# Patient Record
Sex: FEMALE | Race: OTHER | NOT HISPANIC OR LATINO | Employment: FULL TIME | URBAN - METROPOLITAN AREA
[De-identification: names, ages, dates, MRNs, and addresses within clinical notes are randomized per-mention and may not be internally consistent; named-entity substitution may affect disease eponyms.]

---

## 2023-12-04 ENCOUNTER — CONSULT (OUTPATIENT)
Dept: ENDOCRINOLOGY | Facility: CLINIC | Age: 38
End: 2023-12-04
Payer: COMMERCIAL

## 2023-12-04 ENCOUNTER — APPOINTMENT (OUTPATIENT)
Dept: ENDOCRINOLOGY | Facility: CLINIC | Age: 38
End: 2023-12-04

## 2023-12-04 VITALS
WEIGHT: 207 LBS | DIASTOLIC BLOOD PRESSURE: 81 MMHG | BODY MASS INDEX: 39.08 KG/M2 | HEART RATE: 77 BPM | SYSTOLIC BLOOD PRESSURE: 122 MMHG | HEIGHT: 61 IN

## 2023-12-04 DIAGNOSIS — N97.9 SECONDARY FEMALE INFERTILITY: ICD-10-CM

## 2023-12-04 DIAGNOSIS — Z13.29 SCREENING FOR THYROID DISORDER: Primary | ICD-10-CM

## 2023-12-04 DIAGNOSIS — Z11.59 ENCOUNTER FOR SCREENING FOR OTHER VIRAL DISEASES: ICD-10-CM

## 2023-12-04 DIAGNOSIS — Z31.41 FERTILITY TESTING: ICD-10-CM

## 2023-12-04 DIAGNOSIS — Z11.3 SCREENING FOR STDS (SEXUALLY TRANSMITTED DISEASES): ICD-10-CM

## 2023-12-04 DIAGNOSIS — Z01.83 ENCOUNTER FOR RH BLOOD TYPING: ICD-10-CM

## 2023-12-04 PROCEDURE — 99204 OFFICE O/P NEW MOD 45 MIN: CPT | Performed by: NURSE PRACTITIONER

## 2023-12-04 PROCEDURE — 99214 OFFICE O/P EST MOD 30 MIN: CPT | Performed by: NURSE PRACTITIONER

## 2023-12-04 ASSESSMENT — LIFESTYLE VARIABLES
HISTORY_ALCOHOL_USE: NO
TOBACCO_USE: NO

## 2023-12-04 ASSESSMENT — PAIN SCALES - GENERAL: PAINLEVEL: 0-NO PAIN

## 2023-12-04 NOTE — PROGRESS NOTES
In Person    NEW FERTILITY PATIENT VISIT  SULEMA Tavarez  Referred by:   Accompanied today by: n/a, Self    Deena Art is a 38 y.o.  female who presents with Please tell us your reason for this visit today: Infertility    Stopped cOCP 2 years ago actively trying to conceive since stopping the cOCP.     PRIOR EVALUATION / TREATMENT  None     Prior Labs  Lab Results    Date Done      AMH: No results found for requested labs within last 1825 days. No results found for requested labs within last 1825 days.   TSH: 0.70 (Ref range: 0.44 - 3.98 mIU/L) 2022   PRL: No results found for requested labs within last 1825 days. No results found for requested labs within last 1825 days.   Testosterone: No results found for requested labs within last 1825 days. No results found for requested labs within last 1825 days.   DHEAS: No results found for requested labs within last 1825 days. No results found for requested labs within last 1825 days.   FSH: No results found for requested labs within last 1825 days. No results found for requested labs within last 1825 days.   17 OHP: No results found for requested labs within last 1825 days. No results found for requested labs within last 1825 days.   HgbA1c: 5.6 (Ref range: %) 2022   Hepatitis B surface antigen: No results found for requested labs within last 1825 days. No results found for requested labs within last 1825 days.   Hepatitis C antibody: No results found for requested labs within last 1825 days. No results found for requested labs within last 1825 days.   HIV ½ Antigen Antibody screen with reflex: No results found for requested labs within last 1825 days. No results found for requested labs within last 1825 days.   Syphilis screening with reflex: No results found for requested labs within last 1825 days. No results found for requested labs within last 1825 days.   GC: No results found for requested labs within last 1825 days. No results found for  requested labs within last 1825 days.   CT: No results found for requested labs within last 1825 days. No results found for requested labs within last 1825 days.   Type and Screen: A 2019   Rh: POS 2019   Antibody: No results found for requested labs within last 1825 days. No results found for requested labs within last 1825 days.   Rubella: No results found for requested labs within last 1825 days. No results found for requested labs within last 1825 days.   Varicella: No results found for requested labs within last 1825 days. No results found for requested labs within last 1825 days.   Hemoglobin: No results found for requested labs within last 1825 days. No results found for requested labs within last 1825 days.   Hematocrit: No results found for requested labs within last 1825 days. No results found for requested labs within last 1825 days.   Creatinine: 0.83 (Ref range: 0.50 - 1.05 mg/dL) 2022   AST:12 (Ref range: 9 - 39 U/L) 2022   ALT:10 (Ref range: 7 - 45 U/L): 2022        Relationship Status:   x 20 years      OB Hx all conceived spontaneously with current partner within 3-6 months of TTC   2005: FT, postdates 42 wga, IOL, LTCS for fetal distress, no complications in pregnancy, no complications with delivery, no complications postpartum,  x 6 weeks, went back on cOCP at 6 weeks PP   2007: FT IOL 39 wga , , no complications in pregnancy, no complications with delivery, no complications postpartum, did not breastfeed  went back on cOCP at 6 weeks PP   2010: FT IOL 39 wga , no complications in pregnancy, no complications with delivery, no complications postpartum, did not breastfeed went back on cOCP at 6 weeks PP   2014:  FT IOL 39 wga , no complications in pregnancy, no complications with delivery, no complications postpartum, did not breastfeed went back on cOCP at 6 weeks PP   2019:  FT IOL 39 wga , no complications in pregnancy, no complications with  delivery, no complications postpartum, did not breastfeed went back on cOCP at 6 weeks PP       OB History          5    Para   5    Term                AB        Living             SAB        IAB        Ectopic        Multiple        Live Births   5                 GYN HISTORY   Have you ever been diagnosed with a sexually transmitted disease? Have you ever been diagnosed with a sexually transmitted disease?: No    Please select all that are applicable:    Have you ever had Pelvic Inflammatory Disease? Have you ever had Pelvic Inflammatory Disease?: No    Have you had an abnormal PAP smear? Have you had an abnormal PAP smear?: No    Date & Result of last PAP smear: 2021 negative cytology negative HPV   Have you ever had an abnormal Mammogram? Have you ever had an abnormal mammogram?: No    Date & result of your last mammogram:  10 years ago, left breast cyst, told resume mammograms at Age 39  Do you have pelvic pain? Do you have pelvic pain?: No    How many times per week do you have intercourse? If applicable, how many times a week are you having intercourse, trying to get pregnant during your fertile window?: 2    Do you have pain with intercourse? Do you have pain with intercourse?: No    Do you use lubricants with intercourse? If applicable, what type of lubricant are you using?: Na    Do you have pain with bowel movements?    None          Do you have pain with a full bladder? Does get pain with full bladder, does have increased frequency  MENSTRUAL HISTORY  LMP: When was your last menstrual period?: I do not recall.  2023  Menarche:  13 years old   Contraception: cOCP, stopped taking 2 years ago  Cycle length: What is the average number of days between menstrual cycles?: 28  Menses used to be Q38 days very regular, now menses are Q28 days with ovulation CD 14  Describe your bleeding: Describe your bleeding:: Heavy  Bleeds for 7-9 days, not heavy but longer than  prepregnancy  Dysmenorrhea: Are your menstrual periods painful?: Yes  Severe menstrual cramps CD 3-5      ENDOCRINE/INFERTILITY HISTORY  Duration of infertility:  2 years   Coital Activity/week: If applicable, how many times a week are you having intercourse, trying to get pregnant during your fertile window?: 2    Nipple Discharge: Do you experience any loss of milk or liquid discharge from the breasts?: No    Vision changes: Are you experiencing any vision changes?: No    Headaches: Are you experiencing headaches?: Yes    Excess hair growth: Are you experiencing persistent or worsening hair growth on the face, breasts or lower abdomen?: No    Excessive hair loss: Are you experiencing loss of hair from your scalp?: Yes  Hair loss all over   Acne: Are you experiencing acne?: Yes    Oily skin: Oily skin?: Yes    Recent weight change: none   Weight gain: Are you experiencing any increase in weight?: Yes  Since last pregnancy has not been able to loose baby weight   Weight loss: Are you experiencing any decrease in weight?: No    Exercise more than 3 times a week: Exercise more than 3 times a week?: No      PMH  none  Past Medical History:   Diagnosis Date    Encounter for gynecological examination (general) (routine) without abnormal findings     Pap test, as part of routine gynecological examination    Encounter for routine postpartum follow-up 04/04/2022    Postpartum care following vaginal delivery    Encounter for supervision of normal pregnancy, unspecified, unspecified trimester 08/28/2018    Pregnancy at early stage    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     No current facility-administered  medications on file prior to visit.        PSH  Past Surgical History:   Procedure Laterality Date     SECTION, CLASSIC  2014     Section        PSYCH HISTORY  Have you ever been diagnosed with a mental health Issue?: No    Have you ever been hospitalized for a mental health disorder?: No       SOCIAL HISTORY  Social History     Tobacco Use    Smoking status: Never    Smokeless tobacco: Never   Vaping Use    Vaping Use: Never used   Substance Use Topics    Alcohol use: Never    Drug use: Never     Occupation:     Have you ever been incarcerated? Have you ever been incarcerated?: No    Do you have a history of domestic violence? Do you have a history of domestic violence?: No    Do you feel safe at home? Do you feel safe at home?: Yes    Do you have a history of any negative sexual experience such as incest or rape? 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:: Head of school    Prior fertility history: 5 children together   PMH: Partner Past Medical History:: Na    PSH: Partner Past Surgical History:: Na    Smoking:Partner: Recent or current tobacco use: Yes  Smokes cigarrettes   Alcohol Use: Partner: Recent or current alcohol use: No    Drug Use: Partner: Recent or current drug use: No    Medications:  none  Injuries: Partner: Any history of surgeries or injuries in the reproductive area?: No    STD: Have you ever been diagnosed with a sexually transmitted disease?: No    Please select all that are applicable:    SA: Prior Semen Analysis completed?: No    SA Results:  n/a    FAMILY HISTORY  No family history on file.    CANCER HISTORY    Breast: Breast Cancer: Please answer Yes, No or Unsure. If Yes, please, identify which family member.: Na    Ovarian: Ovarian Cancer: Please answer Yes, No or Unsure.  "If Yes, please, identify which family member.: Na    Colon: Colon Cancer: Please answer Yes, No or Unsure. If Yes, please, identify which family member.: Na    Endometrial: Endometrial Cancer: Please answer Yes, No or Unsure. If Yes, please, identify which family member.: Na      FAMILY VTE HISTORY  Family History of Blood Clots: Blood Clots: Please answer Yes, No or Unsure. If Yes, please, identify which family member.: Na      GENETIC HISTORY  Ethnic Background  Patient:  , [place]  Partner:  , [place]   Genetic Disease in Family  Patient: Patient: Defects and genetic syndromes? Please answer Yes, No or Unsure: No    Partner: Partner: Defects and genetic syndromes? Please answer Yes, No or Unsure: No    Birth Defects in Family  Patient: Patient: Birth defects? Please answer Yes, No or Unsure: No    Partner: Partner: Birth defects? Please answer Yes, No or Unsure: No    Genetic screening performed previously:  Carrier screening done through [email or web address], found to be \"compatible\" per pt      BMI:   BMI Readings from Last 1 Encounters:   23 39.11 kg/m²     VITALS:  /81 (BP Location: Right arm, Patient Position: Sitting, BP Cuff Size: Adult)   Pulse 77   Ht 1.549 m (5' 1\")   Wt 93.9 kg (207 lb)   LMP 2023   BMI 39.11 kg/m²     ASSESSMENT   38 y.o.  female with  secondary infertility x 2 years, suspected ovulation and the following pertinent medical issues: n/a .  Partner SA: No Assessment    COUNSELING  Pt is concerned that her bleeding length of her menses has become longer and cycles have become shorter making ovulation is too close to mikvah, (38 day cycles to 28 day cycles). Briefly discussed LP estrace to try to length follicular phase, but first recommend assessing ovarian reserve and assessing uterus for etiologies of bleeding    We discussed causes of infertility including hormonal, egg quality issues, structural problems such as endometriosis, adhesions, or tubal " problems, uterine factors such as polyps or fibroids, and sperm issues. Reviewed evaluation of such as well. We discussed various methods for achieving pregnancy in some detail including, ovulation induction, insemination, superovulation and IVF.    We discussed the impact of age on fertility. We discussed that a woman is born with all of the follicles that she will have in her lifetime and that these numbers progressively decrease as the patient reaches menopause. We discussed that women can remain fertile into their late 30’s and even early 40’s, however, chance for success is significantly lower for women who have infertility. We also discussed the higher rates of aneuploidy and miscarriage that occur as women age.    Routine Testing  Fertility Center  STDs Within 1 year   Genetic carrier Waiver/Completed   T&S Within 1 year   AMH Within 1 year   TSH Within 1 year   Rubella/Varicella Within 5 years     BMI Testing  Fertility Center  CBC Within 1 year   CMP Within 1 year   HgbA1c Within 1 year   Mag, Phos, Vit D <18 Within 1 year   MFM > 40  REQ   Wt loss consult > 40 OPT     PLAN  Orders Placed This Encounter   Procedures    US pelvis transvaginal    Antimullerian Hormone (Amh)    TSH with reflex to Free T4 if abnormal    Type And Screen    Rubella Antibody, Igg    Varicella Zoster Antibody, Igg    Hepatitis B surface antigen    Hepatitis C Antibody    Syphilis Screen with Reflex    HIV-1 and HIV-2 antibodies    C. Trachomatis / N. Gonorrhoeae, Amplified Detection       GENETIC SCREENING PATIENT  Waiver    PARTNER  Yes Semen Analysis: Pt declined partner screening at this time  Declined genetic carrier screening will need to sign waiver     FOLLOW UP   Pelvic ultrasound and labs  Chart to primary nurse for care coordination and patient check list/education  Enroll in Engaged MD  Take prenatal vitamins, vitamin D 2000 IUs daily  Discussed that PAP and mammogram must be updated if appropriate based on age and  clinical history and results received before treatment can begin  Discussed that treatment cannot proceed until checklist items are complete   6 week follow up with MD and [facility] (Dr. Bruce or myself)  If above testing wnl will likely plan for LP estrace to lengthen follicular phase +/- clomid for ovulation induction.      Joyce Morgan  12/04/2023  11:06 AM

## 2023-12-05 ENCOUNTER — ANCILLARY PROCEDURE (OUTPATIENT)
Dept: ENDOCRINOLOGY | Facility: CLINIC | Age: 38
End: 2023-12-05
Payer: COMMERCIAL

## 2023-12-05 DIAGNOSIS — Z31.41 FERTILITY TESTING: ICD-10-CM

## 2023-12-05 PROCEDURE — 76830 TRANSVAGINAL US NON-OB: CPT | Performed by: OBSTETRICS & GYNECOLOGY

## 2023-12-05 PROCEDURE — 76830 TRANSVAGINAL US NON-OB: CPT

## 2023-12-13 ENCOUNTER — LAB (OUTPATIENT)
Dept: LAB | Facility: LAB | Age: 38
End: 2023-12-13
Payer: COMMERCIAL

## 2023-12-13 DIAGNOSIS — Z13.29 SCREENING FOR THYROID DISORDER: ICD-10-CM

## 2023-12-13 DIAGNOSIS — Z11.3 SCREENING FOR STDS (SEXUALLY TRANSMITTED DISEASES): ICD-10-CM

## 2023-12-13 DIAGNOSIS — Z01.83 ENCOUNTER FOR RH BLOOD TYPING: ICD-10-CM

## 2023-12-13 DIAGNOSIS — Z31.41 FERTILITY TESTING: ICD-10-CM

## 2023-12-13 DIAGNOSIS — Z11.59 ENCOUNTER FOR SCREENING FOR OTHER VIRAL DISEASES: ICD-10-CM

## 2023-12-13 LAB
ABO GROUP (TYPE) IN BLOOD: NORMAL
ANTIBODY SCREEN: NORMAL
HBV SURFACE AG SERPL QL IA: NONREACTIVE
HCV AB SER QL: NONREACTIVE
HIV 1+2 AB+HIV1 P24 AG SERPL QL IA: NONREACTIVE
RH FACTOR (ANTIGEN D): NORMAL
RUBV IGG SERPL IA-ACNC: 1.2 IA
RUBV IGG SERPL QL IA: POSITIVE
T PALLIDUM AB SER QL: NONREACTIVE
TSH SERPL-ACNC: 0.97 MIU/L (ref 0.44–3.98)
VARICELLA ZOSTER IGG INDEX: 4 IA
VZV IGG SER QL IA: POSITIVE

## 2023-12-13 PROCEDURE — 87340 HEPATITIS B SURFACE AG IA: CPT

## 2023-12-13 PROCEDURE — 36415 COLL VENOUS BLD VENIPUNCTURE: CPT

## 2023-12-13 PROCEDURE — 86780 TREPONEMA PALLIDUM: CPT

## 2023-12-13 PROCEDURE — 86317 IMMUNOASSAY INFECTIOUS AGENT: CPT

## 2023-12-13 PROCEDURE — 84443 ASSAY THYROID STIM HORMONE: CPT

## 2023-12-13 PROCEDURE — 86850 RBC ANTIBODY SCREEN: CPT

## 2023-12-13 PROCEDURE — 86787 VARICELLA-ZOSTER ANTIBODY: CPT

## 2023-12-13 PROCEDURE — 87389 HIV-1 AG W/HIV-1&-2 AB AG IA: CPT

## 2023-12-13 PROCEDURE — 86803 HEPATITIS C AB TEST: CPT

## 2023-12-13 PROCEDURE — 86901 BLOOD TYPING SEROLOGIC RH(D): CPT

## 2023-12-13 PROCEDURE — 86900 BLOOD TYPING SEROLOGIC ABO: CPT

## 2023-12-13 PROCEDURE — 83516 IMMUNOASSAY NONANTIBODY: CPT

## 2023-12-13 PROCEDURE — 87800 DETECT AGNT MULT DNA DIREC: CPT

## 2023-12-14 LAB
C TRACH RRNA SPEC QL NAA+PROBE: NEGATIVE
N GONORRHOEA DNA SPEC QL PROBE+SIG AMP: NEGATIVE

## 2023-12-16 LAB — MIS SERPL-MCNC: 2.06 NG/ML (ref 0.18–11.71)

## 2023-12-26 ENCOUNTER — TELEPHONE (OUTPATIENT)
Dept: ENDOCRINOLOGY | Facility: CLINIC | Age: 38
End: 2023-12-26

## 2023-12-26 DIAGNOSIS — Z32.01 POSITIVE URINE PREGNANCY TEST (HHS-HCC): ICD-10-CM

## 2023-12-26 NOTE — PROGRESS NOTES
Returned patient phone call. Had seen CYNDY Morgan CNP on 12/04 for initial evaluation to establish care for fertility treatment but called today with a positive UPT. LMP=12/04. Natural TIC. Patient reports it was a very short menses (only about 1.5 days, not typical for usual cycles) and had a positive UPT over the weekend. Patient reported she had been taking a weightloss medication (Saxenda) as she had not anticipated she would conceive at this time. Last dose would have been 11/21, has some concern that there would be been overlapping depending on when conception actually occurred as this medication is contraindicated with pregnancy. Patient is no longer on medication since 11/21. Relayed to patient that we will start with having her go for an HCG level in the morning on 12/27, and then we will follow-up with her on next steps. Patient verbalized understanding of plan, all questions answered at this time. Will add patient to noon huddle for 12/27 for follow-up.   ALEAH SHANNON RN 12/26/2023 11:02

## 2023-12-27 NOTE — PROGRESS NOTES
Patient had HCG at outpatient  Lab today.  Positive UPT on 12/26/23 and also over past weekend.  LMP=12/04, Natural TIC cycle, Est GA = 3 2/7 weeks.    Patient reports it was a very short menses (only about 1.5 days, not typical for usual cycles).  Patient reported she had been taking a weightloss medication (Saxenda) as she had not anticipated she would conceive at this time. Last dose would have been 11/21, has some concern that there would be been overlapping depending on when conception actually occurred as this medication is contraindicated with pregnancy. Patient is no longer on medication since 11/21.   MARÍA ELENA Rosales RN 12/27/23 @ 0940    Patient still has not had HCG drawn by end of day. Will place on Noon Huddle for tomorrow to monitor for results and review with provider.  HAYLEE ROSALES on 12/27/23 at 4:30 PM.

## 2023-12-28 NOTE — PROGRESS NOTES
Patient going for HCG at outpatient  Lab today.  Positive UPT on 12/26/23 and also over past weekend.  LMP=12/04, Natural TIC cycle, Est GA = 3 3/7 weeks.    Patient did not go yesterday for HCG draw and has not gone yet this morning, will contact patient today to see when she is able to go for HCG level and have provider review for next steps.    ALEAH SHANNON RN 12/28/2023 11:49    Left voicemail to touch base with patient. Encouraged patient to call back, will add patient to noon huddle for tomorrow to follow-up in event lab work is completed tomorrow.   ALEAH SHANNON RN 12/28/2023 13:50

## 2023-12-29 ENCOUNTER — LAB (OUTPATIENT)
Dept: LAB | Facility: LAB | Age: 38
End: 2023-12-29
Payer: COMMERCIAL

## 2023-12-29 DIAGNOSIS — Z32.01 POSITIVE URINE PREGNANCY TEST (HHS-HCC): ICD-10-CM

## 2023-12-29 LAB — B-HCG SERPL-ACNC: ABNORMAL MIU/ML

## 2023-12-29 PROCEDURE — 36415 COLL VENOUS BLD VENIPUNCTURE: CPT

## 2023-12-29 PROCEDURE — 84702 CHORIONIC GONADOTROPIN TEST: CPT

## 2023-12-29 NOTE — PROGRESS NOTES
Patient going for HCG at outpatient  Lab today.  Positive UPT on 12/26/23 and also over past weekend.  LMP=12/04, Natural TIC cycle, Est GA = 3 4/7 weeks.    Patient was supposed to go for labs on 12/27, has not gone yet. Left voicemail yesterday to touch base/ see if she still plans to go to lab. Have not heard back from patient. Will plan to contact patient should she go for lab work to touch base on next steps.     ALEAH SHANNON RN 12/29/2023 11:10    Labs are in process and pending -- message sent to MANSOOR Keenan MD to follow -up with patient once labs have resulted.   ALEAH SHANNON RN 12/29/2023 15:15    Reviewed result of hCG level: 74,873, given elevated level at 3w5d there is possible concern for molar pregnancy. Recommend repeat hCG level and early OB scan on Tuesday, 1/2/24. Called patient to discuss plan but went direct to . Left detailed message with patient that we would schedule her for Tuesday and to call with any concerns. Sent request to  to schedule with patient.   Discussed with Dr. Mejia  Reviewed and approved by LORI KEENAN on 12/30/23 at 10:31 AM.

## 2023-12-30 DIAGNOSIS — O36.80X0 ENCOUNTER TO DETERMINE FETAL VIABILITY OF PREGNANCY, NOT APPLICABLE OR UNSPECIFIED FETUS (HHS-HCC): Primary | ICD-10-CM

## 2023-12-30 DIAGNOSIS — O02.81 INAPPROPRIATE LEVEL OF QUANTITATIVE HCG FOR GESTATIONAL AGE IN EARLY PREGNANCY (HHS-HCC): ICD-10-CM

## 2024-01-02 ENCOUNTER — ANCILLARY PROCEDURE (OUTPATIENT)
Dept: ENDOCRINOLOGY | Facility: CLINIC | Age: 39
End: 2024-01-02
Payer: COMMERCIAL

## 2024-01-02 ENCOUNTER — OFFICE VISIT (OUTPATIENT)
Dept: ENDOCRINOLOGY | Facility: CLINIC | Age: 39
End: 2024-01-02
Payer: COMMERCIAL

## 2024-01-02 ENCOUNTER — APPOINTMENT (OUTPATIENT)
Dept: ENDOCRINOLOGY | Facility: CLINIC | Age: 39
End: 2024-01-02
Payer: COMMERCIAL

## 2024-01-02 DIAGNOSIS — O36.80X0 ENCOUNTER TO DETERMINE FETAL VIABILITY OF PREGNANCY, SINGLE OR UNSPECIFIED FETUS (HHS-HCC): Primary | ICD-10-CM

## 2024-01-02 DIAGNOSIS — O36.80X0 ENCOUNTER TO DETERMINE FETAL VIABILITY OF PREGNANCY, NOT APPLICABLE OR UNSPECIFIED FETUS (HHS-HCC): ICD-10-CM

## 2024-01-02 DIAGNOSIS — O02.81 INAPPROPRIATE LEVEL OF QUANTITATIVE HCG FOR GESTATIONAL AGE IN EARLY PREGNANCY (HHS-HCC): ICD-10-CM

## 2024-01-02 LAB — B-HCG SERPL-ACNC: ABNORMAL MIU/ML

## 2024-01-02 PROCEDURE — 84702 CHORIONIC GONADOTROPIN TEST: CPT

## 2024-01-02 PROCEDURE — 99213 OFFICE O/P EST LOW 20 MIN: CPT | Performed by: NURSE PRACTITIONER

## 2024-01-02 PROCEDURE — 1036F TOBACCO NON-USER: CPT | Performed by: NURSE PRACTITIONER

## 2024-01-02 PROCEDURE — 36415 COLL VENOUS BLD VENIPUNCTURE: CPT

## 2024-01-02 PROCEDURE — 76817 TRANSVAGINAL US OBSTETRIC: CPT | Performed by: OBSTETRICS & GYNECOLOGY

## 2024-01-02 PROCEDURE — 76817 TRANSVAGINAL US OBSTETRIC: CPT

## 2024-01-03 NOTE — PROGRESS NOTES
In Person    OB Scan    Ectopic risk factor: no  PGTA/PGTM: no  Blood type: A+    Reviewed results from OB ultrasound today and results reviewed with pt as follows:     OB Scan results:   U/S 1/2/2024 based upon CRL 7 w + 5 d 8/15/2024  Assigned dating based on ultrasound (CRL), selected on 01/2/2024  7 w + 5 d 8/15/2024  Impression Early single intrauterine pregnancy with cardiac activity present. Size is not consistent with  dating provided. Recommend re-dating based on ultrasound.  Follow-up Plan: Release to OB Provider  Assessment Gestational sac: visualized. Location: intrauterine  Yolk sac: visualized  Embryo: visualized  Cardiac activity: present  Early placenta: circumferential  YS 3.6 mm 6w 3d 15% Papaioannou  CRL 13.1 mm 7w 5d 50% Pexsters   bpm    Detailed discussion with patient about her scan results, pregnancy, and next steps:    Plan:   Reviewed medications in detail. She will continue PNV.   Discussed with patient any pregnancy concerns and discussed establishing care with an  OB. Ok to transfer care to primary OBGYN at this time.   Advised to call with bleeding, pain or concerns.  Recommend pt call OB and schedule initial PNV prior to 12 wga. Pt will call Dr. Real's office today.    Ultrasound results and Plan of care reviewed with MD Joyce Joseph  01/02/2024  8:52 PM

## 2024-01-09 ENCOUNTER — APPOINTMENT (OUTPATIENT)
Dept: ENDOCRINOLOGY | Facility: CLINIC | Age: 39
End: 2024-01-09
Payer: COMMERCIAL

## 2024-01-17 ENCOUNTER — INITIAL PRENATAL (OUTPATIENT)
Dept: OBSTETRICS AND GYNECOLOGY | Facility: CLINIC | Age: 39
End: 2024-01-17
Payer: COMMERCIAL

## 2024-01-17 VITALS — DIASTOLIC BLOOD PRESSURE: 68 MMHG | BODY MASS INDEX: 39.11 KG/M2 | SYSTOLIC BLOOD PRESSURE: 115 MMHG | WEIGHT: 207 LBS

## 2024-01-17 DIAGNOSIS — Z3A.09 9 WEEKS GESTATION OF PREGNANCY (HHS-HCC): ICD-10-CM

## 2024-01-17 DIAGNOSIS — R82.71 ANTEPARTUM ASYMPTOMATIC BACTERIURIA IN FIRST TRIMESTER (HHS-HCC): ICD-10-CM

## 2024-01-17 DIAGNOSIS — O99.891 ANTEPARTUM ASYMPTOMATIC BACTERIURIA IN FIRST TRIMESTER (HHS-HCC): ICD-10-CM

## 2024-01-17 DIAGNOSIS — O09.521 ADVANCED MATERNAL AGE IN MULTIGRAVIDA, FIRST TRIMESTER (HHS-HCC): Primary | ICD-10-CM

## 2024-01-17 PROCEDURE — 87086 URINE CULTURE/COLONY COUNT: CPT

## 2024-01-17 PROCEDURE — 0500F INITIAL PRENATAL CARE VISIT: CPT | Performed by: OBSTETRICS & GYNECOLOGY

## 2024-01-17 ASSESSMENT — EDINBURGH POSTNATAL DEPRESSION SCALE (EPDS)
I HAVE BEEN ANXIOUS OR WORRIED FOR NO GOOD REASON: HARDLY EVER
I HAVE BLAMED MYSELF UNNECESSARILY WHEN THINGS WENT WRONG: NOT VERY OFTEN
I HAVE FELT SAD OR MISERABLE: NO, NOT AT ALL
TOTAL SCORE: 3
I HAVE LOOKED FORWARD WITH ENJOYMENT TO THINGS: AS MUCH AS I EVER DID
THINGS HAVE BEEN GETTING ON TOP OF ME: NO, I HAVE BEEN COPING AS WELL AS EVER
THE THOUGHT OF HARMING MYSELF HAS OCCURRED TO ME: NEVER
I HAVE BEEN SO UNHAPPY THAT I HAVE HAD DIFFICULTY SLEEPING: NOT AT ALL
I HAVE FELT SCARED OR PANICKY FOR NO GOOD REASON: NO, NOT MUCH
I HAVE BEEN SO UNHAPPY THAT I HAVE BEEN CRYING: NO, NEVER
I HAVE BEEN ABLE TO LAUGH AND SEE THE FUNNY SIDE OF THINGS: AS MUCH AS I ALWAYS COULD

## 2024-01-17 NOTE — PROGRESS NOTES
Subjective   Patient ID [id number]   Deena Art is a 38 y.o.  at 9w6d with a working estimated date of delivery of 8/15/2024, by Ultrasound who presents for an initial prenatal visit. This pregnancy is planned.    Her pregnancy is complicated by:  Advanced maternal age     Pt is overall feeling well. Having some nausea and fatigue.   Pt had 2 years of infertility and recently saw CRUZITO but then conceived naturally.  She is dated by a 7 week US which did not agree with her LMP.     Pt has had CD x 1 (first pregnancy).  She reports elevated BP at 42+ weeks but no diagnosis of pre-e.  CD was for NRFHT.  She subsequently has had 4 uncomplicated pregnancies with vaginal deliveries and would prefer IOL at 39 weeks.     OB History    Para Term  AB Living   6 5 5     5   SAB IAB Ectopic Multiple Live Births           5      # Outcome Date GA Lbr Julio/2nd Weight Sex Delivery Anes PTL Lv   6 Current            5 Term    3.317 kg F Vag-Spont      4 Term    3.374 kg M Vag-Spont   JOSE ANGEL   3 Term    2.863 kg F Vag-Spont   JOSE ANGEL   2 Term    3.402 kg F Vag-Spont   JOSE ANGEL   1 Term    3.317 kg M CS-LTranv   JOSE ANGEL     [place]  Depression Scale Total: 3    Objective   Physical Exam  Weight: 93.9 kg (207 lb)  Expected Total Weight Gain: 5 kg (11 lb)-9 kg (19 lb)   Pregravid BMI: 39.13  BP: 115/68          OBGyn Exam    Prenatal Labs  Pt had recent lab work which was wnl. Will not repeat labs just done as patient was actually pregnant at the time.  Pt interested in NIPS.     Assessment/Plan       Prenatal labs ordered.   NIPS ordered.   Pt will follow up in 2 weeks for doppler FHT check.

## 2024-01-19 LAB — BACTERIA UR CULT: ABNORMAL

## 2024-01-22 ENCOUNTER — LAB (OUTPATIENT)
Dept: LAB | Facility: LAB | Age: 39
End: 2024-01-22
Payer: COMMERCIAL

## 2024-01-22 DIAGNOSIS — O09.521 ADVANCED MATERNAL AGE IN MULTIGRAVIDA, FIRST TRIMESTER (HHS-HCC): ICD-10-CM

## 2024-01-22 DIAGNOSIS — Z3A.09 9 WEEKS GESTATION OF PREGNANCY (HHS-HCC): ICD-10-CM

## 2024-01-22 LAB
ABO GROUP (TYPE) IN BLOOD: NORMAL
ANTIBODY SCREEN: NORMAL
ERYTHROCYTE [DISTWIDTH] IN BLOOD BY AUTOMATED COUNT: 12.5 % (ref 11.5–14.5)
HCT VFR BLD AUTO: 38.3 % (ref 36–46)
HGB BLD-MCNC: 12.5 G/DL (ref 12–16)
MCH RBC QN AUTO: 28.5 PG (ref 26–34)
MCHC RBC AUTO-ENTMCNC: 32.6 G/DL (ref 32–36)
MCV RBC AUTO: 87 FL (ref 80–100)
NRBC BLD-RTO: 0 /100 WBCS (ref 0–0)
PLATELET # BLD AUTO: 359 X10*3/UL (ref 150–450)
RBC # BLD AUTO: 4.39 X10*6/UL (ref 4–5.2)
RH FACTOR (ANTIGEN D): NORMAL
WBC # BLD AUTO: 9.7 X10*3/UL (ref 4.4–11.3)

## 2024-01-22 PROCEDURE — 85027 COMPLETE CBC AUTOMATED: CPT

## 2024-01-22 PROCEDURE — 86850 RBC ANTIBODY SCREEN: CPT

## 2024-01-22 PROCEDURE — 86901 BLOOD TYPING SEROLOGIC RH(D): CPT

## 2024-01-22 PROCEDURE — 36415 COLL VENOUS BLD VENIPUNCTURE: CPT

## 2024-01-22 PROCEDURE — 86900 BLOOD TYPING SEROLOGIC ABO: CPT

## 2024-01-22 RX ORDER — CEPHALEXIN 500 MG/1
500 CAPSULE ORAL 4 TIMES DAILY
Qty: 20 CAPSULE | Refills: 0 | Status: SHIPPED | OUTPATIENT
Start: 2024-01-22 | End: 2024-01-27

## 2024-01-30 ENCOUNTER — ROUTINE PRENATAL (OUTPATIENT)
Dept: OBSTETRICS AND GYNECOLOGY | Facility: CLINIC | Age: 39
End: 2024-01-30
Payer: COMMERCIAL

## 2024-01-30 VITALS — DIASTOLIC BLOOD PRESSURE: 72 MMHG | WEIGHT: 210 LBS | BODY MASS INDEX: 39.68 KG/M2 | SYSTOLIC BLOOD PRESSURE: 118 MMHG

## 2024-01-30 DIAGNOSIS — O09.521 ADVANCED MATERNAL AGE IN MULTIGRAVIDA, FIRST TRIMESTER (HHS-HCC): ICD-10-CM

## 2024-01-30 DIAGNOSIS — Z3A.11 11 WEEKS GESTATION OF PREGNANCY (HHS-HCC): Primary | ICD-10-CM

## 2024-01-30 PROCEDURE — 0501F PRENATAL FLOW SHEET: CPT | Performed by: OBSTETRICS & GYNECOLOGY

## 2024-01-30 NOTE — PROGRESS NOTES
Pt currently 11w5d  Pt has no complaints except some right sided pain and bulge.  Normal fetal movement. Denies ctx, LOF, VB.     +FHT on bedside US  NIPS wnl. It's a girl!  Pt unsure about NT  Questionable right sided abdominal hernia. Mild intermittent discomfort. Will cont to monitor.

## 2024-03-06 ENCOUNTER — ROUTINE PRENATAL (OUTPATIENT)
Dept: OBSTETRICS AND GYNECOLOGY | Facility: CLINIC | Age: 39
End: 2024-03-06
Payer: COMMERCIAL

## 2024-03-06 VITALS — BODY MASS INDEX: 40.43 KG/M2 | WEIGHT: 214 LBS | DIASTOLIC BLOOD PRESSURE: 70 MMHG | SYSTOLIC BLOOD PRESSURE: 112 MMHG

## 2024-03-06 DIAGNOSIS — Z3A.16 16 WEEKS GESTATION OF PREGNANCY (HHS-HCC): Primary | ICD-10-CM

## 2024-03-06 PROCEDURE — 0501F PRENATAL FLOW SHEET: CPT | Performed by: OBSTETRICS & GYNECOLOGY

## 2024-03-06 NOTE — PROGRESS NOTES
Pt currently 16w6d  Pt has no complaints.    Normal fetal movement. Denies ctx, LOF, VB.     Dated by 7 week US.  Will schedule anatomy US for 19-20 weeks.     Pt has had CD x 1 (first pregnancy). She reports elevated BP at 42+ weeks but no diagnosis of pre-e. CD was for NRFHT. She subsequently has had 4 uncomplicated pregnancies with vaginal deliveries and would prefer IOL at 39 weeks.

## 2024-03-27 ENCOUNTER — HOSPITAL ENCOUNTER (OUTPATIENT)
Dept: RADIOLOGY | Facility: CLINIC | Age: 39
Discharge: HOME | End: 2024-03-27
Payer: COMMERCIAL

## 2024-03-27 DIAGNOSIS — Z36.89: ICD-10-CM

## 2024-03-27 DIAGNOSIS — Z3A.16 16 WEEKS GESTATION OF PREGNANCY (HHS-HCC): ICD-10-CM

## 2024-03-27 PROCEDURE — 76811 OB US DETAILED SNGL FETUS: CPT | Performed by: OBSTETRICS & GYNECOLOGY

## 2024-03-27 PROCEDURE — 76817 TRANSVAGINAL US OBSTETRIC: CPT | Performed by: OBSTETRICS & GYNECOLOGY

## 2024-03-27 PROCEDURE — 76811 OB US DETAILED SNGL FETUS: CPT

## 2024-03-27 PROCEDURE — 76817 TRANSVAGINAL US OBSTETRIC: CPT

## 2024-04-03 ENCOUNTER — ROUTINE PRENATAL (OUTPATIENT)
Dept: OBSTETRICS AND GYNECOLOGY | Facility: CLINIC | Age: 39
End: 2024-04-03
Payer: COMMERCIAL

## 2024-04-03 VITALS — SYSTOLIC BLOOD PRESSURE: 117 MMHG | DIASTOLIC BLOOD PRESSURE: 74 MMHG | WEIGHT: 213 LBS | BODY MASS INDEX: 40.25 KG/M2

## 2024-04-03 DIAGNOSIS — Z33.1 NORMAL PREGNANCY, INCIDENTAL (HHS-HCC): ICD-10-CM

## 2024-04-03 DIAGNOSIS — Z3A.20 20 WEEKS GESTATION OF PREGNANCY (HHS-HCC): Primary | ICD-10-CM

## 2024-04-03 PROCEDURE — 0501F PRENATAL FLOW SHEET: CPT | Performed by: OBSTETRICS & GYNECOLOGY

## 2024-04-03 NOTE — PROGRESS NOTES
Pt currently 20w6d  Pt has no complaints.    Normal fetal movement. Denies ctx, LOF, VB.       Anatomy US wnl. Growth US scheduled for 30 weeks.   H/o CD x 1 and successful  x 4.

## 2024-05-03 ENCOUNTER — APPOINTMENT (OUTPATIENT)
Dept: OBSTETRICS AND GYNECOLOGY | Facility: CLINIC | Age: 39
End: 2024-05-03
Payer: COMMERCIAL

## 2024-05-07 ENCOUNTER — TELEPHONE (OUTPATIENT)
Dept: OBSTETRICS AND GYNECOLOGY | Facility: CLINIC | Age: 39
End: 2024-05-07
Payer: COMMERCIAL

## 2024-05-07 NOTE — TELEPHONE ENCOUNTER
patient called to obtain an order for blood work she states was discussed and also for the glucose test. Patient canceled her May 3rd appointment and wanted to know if you felt it was necessary to come in before her may 31st appt?

## 2024-05-08 DIAGNOSIS — Z3A.25 25 WEEKS GESTATION OF PREGNANCY (HHS-HCC): Primary | ICD-10-CM

## 2024-05-08 NOTE — TELEPHONE ENCOUNTER
Called patient regarding orders for blood work and appointment follow up  Left vm for patient to return call.    WERNER Recinos RN, MD Brooke Francis RN  Caller: Unspecified (Yesterday, 10:49 AM)  Please let the patient know I Have ordered her mid pregnancy labs.  Please let the patient know her prenatal care is important and she should reschedule the visit she canceled in early May and she should absolutely keep her visit for the end of May.  Thanks.

## 2024-05-09 ENCOUNTER — TELEPHONE (OUTPATIENT)
Dept: OBSTETRICS AND GYNECOLOGY | Facility: CLINIC | Age: 39
End: 2024-05-09
Payer: COMMERCIAL

## 2024-05-09 NOTE — TELEPHONE ENCOUNTER
Called patient regarding mid pregnancy labs and importance of appointments  Left vm for patient to return call.    WERNER Recinos RN        MD Chelsie Russell RN  Caller: Unspecified (2 days ago, 10:49 AM)  Please let the patient know I Have ordered her mid pregnancy labs.  Please let the patient know her prenatal care is important and she should reschedule the visit she canceled in early May and she should absolutely keep her visit for the end of May.  Thanks.

## 2024-05-10 ENCOUNTER — TELEPHONE (OUTPATIENT)
Dept: OBSTETRICS AND GYNECOLOGY | Facility: CLINIC | Age: 39
End: 2024-05-10
Payer: COMMERCIAL

## 2024-05-10 NOTE — TELEPHONE ENCOUNTER
Called patient to inform her of lab orders placed and to encourage patient to reschedule missed appointment  Left vm for patient to return call.    EVELYN RecinosN RN

## 2024-05-17 ENCOUNTER — LAB (OUTPATIENT)
Dept: LAB | Facility: LAB | Age: 39
End: 2024-05-17
Payer: COMMERCIAL

## 2024-05-17 DIAGNOSIS — Z3A.25 25 WEEKS GESTATION OF PREGNANCY (HHS-HCC): ICD-10-CM

## 2024-05-17 LAB
ERYTHROCYTE [DISTWIDTH] IN BLOOD BY AUTOMATED COUNT: 13.3 % (ref 11.5–14.5)
GLUCOSE 1H P 50 G GLC PO SERPL-MCNC: 108 MG/DL
HCT VFR BLD AUTO: 33.3 % (ref 36–46)
HGB BLD-MCNC: 10.5 G/DL (ref 12–16)
MCH RBC QN AUTO: 28.2 PG (ref 26–34)
MCHC RBC AUTO-ENTMCNC: 31.5 G/DL (ref 32–36)
MCV RBC AUTO: 90 FL (ref 80–100)
NRBC BLD-RTO: 0 /100 WBCS (ref 0–0)
PLATELET # BLD AUTO: 369 X10*3/UL (ref 150–450)
RBC # BLD AUTO: 3.72 X10*6/UL (ref 4–5.2)
TREPONEMA PALLIDUM IGG+IGM AB [PRESENCE] IN SERUM OR PLASMA BY IMMUNOASSAY: NONREACTIVE
WBC # BLD AUTO: 11.5 X10*3/UL (ref 4.4–11.3)

## 2024-05-17 PROCEDURE — 86780 TREPONEMA PALLIDUM: CPT

## 2024-05-17 PROCEDURE — 85027 COMPLETE CBC AUTOMATED: CPT

## 2024-05-17 PROCEDURE — 36415 COLL VENOUS BLD VENIPUNCTURE: CPT

## 2024-05-17 PROCEDURE — 82947 ASSAY GLUCOSE BLOOD QUANT: CPT

## 2024-05-29 ENCOUNTER — HOSPITAL ENCOUNTER (OUTPATIENT)
Dept: RADIOLOGY | Facility: CLINIC | Age: 39
Discharge: HOME | End: 2024-05-29
Payer: COMMERCIAL

## 2024-05-29 DIAGNOSIS — Z3A.16 16 WEEKS GESTATION OF PREGNANCY (HHS-HCC): ICD-10-CM

## 2024-05-29 DIAGNOSIS — O99.210: ICD-10-CM

## 2024-05-29 PROCEDURE — 76817 TRANSVAGINAL US OBSTETRIC: CPT

## 2024-05-29 PROCEDURE — 76816 OB US FOLLOW-UP PER FETUS: CPT

## 2024-05-29 PROCEDURE — 76817 TRANSVAGINAL US OBSTETRIC: CPT | Performed by: STUDENT IN AN ORGANIZED HEALTH CARE EDUCATION/TRAINING PROGRAM

## 2024-05-29 PROCEDURE — 76819 FETAL BIOPHYS PROFIL W/O NST: CPT | Performed by: STUDENT IN AN ORGANIZED HEALTH CARE EDUCATION/TRAINING PROGRAM

## 2024-05-29 PROCEDURE — 76816 OB US FOLLOW-UP PER FETUS: CPT | Performed by: STUDENT IN AN ORGANIZED HEALTH CARE EDUCATION/TRAINING PROGRAM

## 2024-05-29 PROCEDURE — 76819 FETAL BIOPHYS PROFIL W/O NST: CPT

## 2024-05-31 ENCOUNTER — ROUTINE PRENATAL (OUTPATIENT)
Dept: OBSTETRICS AND GYNECOLOGY | Facility: CLINIC | Age: 39
End: 2024-05-31
Payer: COMMERCIAL

## 2024-05-31 VITALS — DIASTOLIC BLOOD PRESSURE: 76 MMHG | SYSTOLIC BLOOD PRESSURE: 120 MMHG | WEIGHT: 222 LBS | BODY MASS INDEX: 41.95 KG/M2

## 2024-05-31 DIAGNOSIS — Z3A.29 29 WEEKS GESTATION OF PREGNANCY (HHS-HCC): Primary | ICD-10-CM

## 2024-05-31 PROCEDURE — 0501F PRENATAL FLOW SHEET: CPT | Performed by: OBSTETRICS & GYNECOLOGY

## 2024-05-31 NOTE — PROGRESS NOTES
Pt currently 29w1d  Pt has no complaints except pressure.    Normal fetal movement. Denies ctx, LOF, VB.     Questionable low lying placenta --> follow up ultrasound normal, placenta NOT low lying  -US @ 28.6 weeks , placenta right lateral, not low lying  -growth wnl  -repeat growth at 35 weeks d/t obesity    Recent 1 hr glucose wnl

## 2024-06-07 ENCOUNTER — APPOINTMENT (OUTPATIENT)
Dept: RADIOLOGY | Facility: CLINIC | Age: 39
End: 2024-06-07
Payer: COMMERCIAL

## 2024-06-14 ENCOUNTER — APPOINTMENT (OUTPATIENT)
Dept: OBSTETRICS AND GYNECOLOGY | Facility: CLINIC | Age: 39
End: 2024-06-14
Payer: COMMERCIAL

## 2024-06-14 VITALS — WEIGHT: 224 LBS | BODY MASS INDEX: 42.32 KG/M2 | DIASTOLIC BLOOD PRESSURE: 72 MMHG | SYSTOLIC BLOOD PRESSURE: 106 MMHG

## 2024-06-14 DIAGNOSIS — Z3A.31 31 WEEKS GESTATION OF PREGNANCY (HHS-HCC): Primary | ICD-10-CM

## 2024-06-14 NOTE — PROGRESS NOTES
Pt currently 31w1d  Pt has no complaints.    Normal fetal movement. Denies ctx, LOF, VB.     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  We have discussed how this is close to her due date and not an ideal time to be out of state but if she chooses to go she should research nearby hospital with labor and delivery services in case emergency care is needed.     Plan for TDAP next visit.

## 2024-06-28 ENCOUNTER — APPOINTMENT (OUTPATIENT)
Dept: OBSTETRICS AND GYNECOLOGY | Facility: CLINIC | Age: 39
End: 2024-06-28
Payer: COMMERCIAL

## 2024-06-28 VITALS — WEIGHT: 228 LBS | DIASTOLIC BLOOD PRESSURE: 70 MMHG | SYSTOLIC BLOOD PRESSURE: 114 MMHG | BODY MASS INDEX: 43.08 KG/M2

## 2024-06-28 DIAGNOSIS — Z3A.33 33 WEEKS GESTATION OF PREGNANCY (HHS-HCC): Primary | ICD-10-CM

## 2024-06-28 PROCEDURE — 0501F PRENATAL FLOW SHEET: CPT | Performed by: OBSTETRICS & GYNECOLOGY

## 2024-06-28 PROCEDURE — 90471 IMMUNIZATION ADMIN: CPT | Performed by: OBSTETRICS & GYNECOLOGY

## 2024-06-28 PROCEDURE — 90715 TDAP VACCINE 7 YRS/> IM: CPT | Performed by: OBSTETRICS & GYNECOLOGY

## 2024-06-28 NOTE — PROGRESS NOTES
Pt currently 33w1d  Pt feeling fatigued and a little bit off today. Denies HA, blurry vision. Mainly just tired. Has been doing a lot of preparations for camp and did go out to a wedding last evening.   Normal fetal movement. Denies ctx, LOF, VB.     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 We have discussed how this is close to her due date and not an ideal time to be out of state but if she chooses to go she should research nearby hospital with labor and delivery services in case emergency care is needed.  She plans to be in touch via [employer] with BP once weekly and if any concerns.     TDAP offered and accepted today.     36 week growth US scheduled

## 2024-07-01 ENCOUNTER — TELEPHONE (OUTPATIENT)
Dept: OBSTETRICS AND GYNECOLOGY | Facility: CLINIC | Age: 39
End: 2024-07-01
Payer: COMMERCIAL

## 2024-07-01 NOTE — TELEPHONE ENCOUNTER
Called patient to discuss symptoms she is experiencing  Left vm for patient to return call.    WERNER Recinos RN      ----- Message from Deena Art sent at 6/30/2024  1:04 AM EDT -----  Regarding: Swollen feet  Contact: [phone]  Hi Dr. Real,  Over this weekend I noticed significant swelling in my feet. I never had this during pregnancy only after delivery from IV. Unless it’s maybe a side effect from the dtap vaccine I had Friday. Either way I thought it’s important to bring to your attention…

## 2024-07-01 NOTE — TELEPHONE ENCOUNTER
Patient returning call  Identified by name and   Foot swelling its not typical for her  Specifically by ankles   Took BP on Friday, 114/70 ray   No dizziness, lightheadedness, SOB, h/a, blurred or changing vision, no RUQ pain, no abnormal facial swelling  Swelling is equal on both sides  Encouraged to monitor for now and reach back out if swelling continues to worsen or she notices any other abnormal symptoms  Encouraged fluid intake, resting and elevating extremities, submerging feet in cool water for ~10 minutes at a time, explained likely pregnancy sx and d/t high temperatures over the past 2 weeks  Patient verbalized understanding, all questions/concerns addressed at this time.    Chelsie Murphy, EVELYNN RN

## 2024-07-01 NOTE — TELEPHONE ENCOUNTER
Pt states that she received a phone call and has been trying to return the call,but cannot seem to reach anyone. Pt is requesting that someone please return her call.

## 2024-07-12 ENCOUNTER — APPOINTMENT (OUTPATIENT)
Dept: OBSTETRICS AND GYNECOLOGY | Facility: CLINIC | Age: 39
End: 2024-07-12
Payer: COMMERCIAL

## 2024-07-26 ENCOUNTER — APPOINTMENT (OUTPATIENT)
Dept: OBSTETRICS AND GYNECOLOGY | Facility: CLINIC | Age: 39
End: 2024-07-26
Payer: COMMERCIAL

## 2024-07-26 ENCOUNTER — HOSPITAL ENCOUNTER (OUTPATIENT)
Dept: RADIOLOGY | Facility: CLINIC | Age: 39
Discharge: HOME | End: 2024-07-26
Payer: COMMERCIAL

## 2024-07-26 DIAGNOSIS — Z3A.16 16 WEEKS GESTATION OF PREGNANCY (HHS-HCC): ICD-10-CM

## 2024-07-26 PROCEDURE — 76816 OB US FOLLOW-UP PER FETUS: CPT

## 2024-07-26 PROCEDURE — 76819 FETAL BIOPHYS PROFIL W/O NST: CPT

## 2024-07-29 ENCOUNTER — TELEPHONE (OUTPATIENT)
Dept: OBSTETRICS AND GYNECOLOGY | Facility: CLINIC | Age: 39
End: 2024-07-29

## 2024-07-29 ENCOUNTER — APPOINTMENT (OUTPATIENT)
Dept: OBSTETRICS AND GYNECOLOGY | Facility: CLINIC | Age: 39
End: 2024-07-29
Payer: COMMERCIAL

## 2024-07-30 ENCOUNTER — TELEPHONE (OUTPATIENT)
Dept: OBSTETRICS AND GYNECOLOGY | Facility: CLINIC | Age: 39
End: 2024-07-30
Payer: COMMERCIAL

## 2024-07-30 NOTE — TELEPHONE ENCOUNTER
Pt states that she is about 37wks pregnant and is experiencing discomfort and pain. Pt states that her foot hurt and she cannot sleep. Pt would like to be seen right away.

## 2024-07-30 NOTE — TELEPHONE ENCOUNTER
Called patient to discuss pain she is experiencing  Identified by name and   feels like leg is becoming numb completely left leg, at night it bad too,  From knee to upper thigh  Having contractions, irregular, painful, worse at night, but sending shocks down her leg   She is wondering what all she can do for this  Explained that this does happen and sounds like it is nerve pain, may need support belt as baby is maybe causing discomfort  Encouraged to take tylenol, patient would like order for maternity belt sent in  Patient wondering if she can be seen tomorrow, doesn't know if she can wait til Thursday  Informed her no appointments available and Thursday is soonest  Discussed going to L&D if pain is this severe and that RN will forward to provider to discuss    Chelsie Murphy, EVELYNN RN

## 2024-08-01 ENCOUNTER — APPOINTMENT (OUTPATIENT)
Dept: OBSTETRICS AND GYNECOLOGY | Facility: CLINIC | Age: 39
End: 2024-08-01
Payer: COMMERCIAL

## 2024-08-05 ENCOUNTER — HOSPITAL ENCOUNTER (INPATIENT)
Facility: HOSPITAL | Age: 39
LOS: 3 days | Discharge: HOME | End: 2024-08-08
Attending: OBSTETRICS & GYNECOLOGY | Admitting: OBSTETRICS & GYNECOLOGY
Payer: COMMERCIAL

## 2024-08-05 PROBLEM — Z3A.38 38 WEEKS GESTATION OF PREGNANCY (HHS-HCC): Status: ACTIVE | Noted: 2024-08-05

## 2024-08-05 PROCEDURE — 3E033VJ INTRODUCTION OF OTHER HORMONE INTO PERIPHERAL VEIN, PERCUTANEOUS APPROACH: ICD-10-PCS | Performed by: OBSTETRICS & GYNECOLOGY

## 2024-08-05 PROCEDURE — 1120000001 HC OB PRIVATE ROOM DAILY

## 2024-08-05 PROCEDURE — 99221 1ST HOSP IP/OBS SF/LOW 40: CPT | Performed by: STUDENT IN AN ORGANIZED HEALTH CARE EDUCATION/TRAINING PROGRAM

## 2024-08-05 PROCEDURE — 99214 OFFICE O/P EST MOD 30 MIN: CPT

## 2024-08-05 RX ORDER — SODIUM CHLORIDE, SODIUM LACTATE, POTASSIUM CHLORIDE, CALCIUM CHLORIDE 600; 310; 30; 20 MG/100ML; MG/100ML; MG/100ML; MG/100ML
125 INJECTION, SOLUTION INTRAVENOUS CONTINUOUS
Status: DISCONTINUED | OUTPATIENT
Start: 2024-08-05 | End: 2024-08-06

## 2024-08-05 RX ORDER — METOCLOPRAMIDE HYDROCHLORIDE 5 MG/ML
10 INJECTION INTRAMUSCULAR; INTRAVENOUS EVERY 6 HOURS PRN
Status: DISCONTINUED | OUTPATIENT
Start: 2024-08-05 | End: 2024-08-06

## 2024-08-05 RX ORDER — LOPERAMIDE HYDROCHLORIDE 2 MG/1
4 CAPSULE ORAL EVERY 2 HOUR PRN
Status: DISCONTINUED | OUTPATIENT
Start: 2024-08-05 | End: 2024-08-06

## 2024-08-05 RX ORDER — LIDOCAINE HYDROCHLORIDE 10 MG/ML
30 INJECTION INFILTRATION; PERINEURAL ONCE AS NEEDED
Status: COMPLETED | OUTPATIENT
Start: 2024-08-05 | End: 2024-08-06

## 2024-08-05 RX ORDER — METOCLOPRAMIDE 10 MG/1
10 TABLET ORAL EVERY 6 HOURS PRN
Status: DISCONTINUED | OUTPATIENT
Start: 2024-08-05 | End: 2024-08-06

## 2024-08-05 RX ORDER — MISOPROSTOL 200 UG/1
800 TABLET ORAL ONCE AS NEEDED
Status: DISCONTINUED | OUTPATIENT
Start: 2024-08-05 | End: 2024-08-06

## 2024-08-05 RX ORDER — OXYTOCIN 10 [USP'U]/ML
10 INJECTION, SOLUTION INTRAMUSCULAR; INTRAVENOUS ONCE AS NEEDED
Status: DISCONTINUED | OUTPATIENT
Start: 2024-08-05 | End: 2024-08-06

## 2024-08-05 RX ORDER — ONDANSETRON 4 MG/1
4 TABLET, FILM COATED ORAL EVERY 6 HOURS PRN
Status: DISCONTINUED | OUTPATIENT
Start: 2024-08-05 | End: 2024-08-06

## 2024-08-05 RX ORDER — PENICILLIN G 3000000 [IU]/50ML
3 INJECTION, SOLUTION INTRAVENOUS EVERY 4 HOURS
Status: DISCONTINUED | OUTPATIENT
Start: 2024-08-06 | End: 2024-08-06

## 2024-08-05 RX ORDER — ACETAMINOPHEN 325 MG/1
650 TABLET ORAL EVERY 6 HOURS PRN
Status: DISCONTINUED | OUTPATIENT
Start: 2024-08-05 | End: 2024-08-06

## 2024-08-05 RX ORDER — HYDRALAZINE HYDROCHLORIDE 20 MG/ML
5 INJECTION INTRAMUSCULAR; INTRAVENOUS ONCE AS NEEDED
Status: DISCONTINUED | OUTPATIENT
Start: 2024-08-05 | End: 2024-08-06

## 2024-08-05 RX ORDER — LABETALOL HYDROCHLORIDE 5 MG/ML
20 INJECTION, SOLUTION INTRAVENOUS ONCE AS NEEDED
Status: DISCONTINUED | OUTPATIENT
Start: 2024-08-05 | End: 2024-08-06

## 2024-08-05 RX ORDER — CARBOPROST TROMETHAMINE 250 UG/ML
250 INJECTION, SOLUTION INTRAMUSCULAR ONCE AS NEEDED
Status: DISCONTINUED | OUTPATIENT
Start: 2024-08-05 | End: 2024-08-06

## 2024-08-05 RX ORDER — METHYLERGONOVINE MALEATE 0.2 MG/ML
0.2 INJECTION INTRAVENOUS ONCE AS NEEDED
Status: DISCONTINUED | OUTPATIENT
Start: 2024-08-05 | End: 2024-08-06

## 2024-08-05 RX ORDER — ONDANSETRON HYDROCHLORIDE 2 MG/ML
4 INJECTION, SOLUTION INTRAVENOUS EVERY 6 HOURS PRN
Status: DISCONTINUED | OUTPATIENT
Start: 2024-08-05 | End: 2024-08-06

## 2024-08-05 RX ORDER — OXYTOCIN/0.9 % SODIUM CHLORIDE 30/500 ML
60 PLASTIC BAG, INJECTION (ML) INTRAVENOUS ONCE AS NEEDED
Status: COMPLETED | OUTPATIENT
Start: 2024-08-05 | End: 2024-08-06

## 2024-08-05 RX ORDER — OXYTOCIN/0.9 % SODIUM CHLORIDE 30/500 ML
2-30 PLASTIC BAG, INJECTION (ML) INTRAVENOUS CONTINUOUS
Status: DISCONTINUED | OUTPATIENT
Start: 2024-08-05 | End: 2024-08-06

## 2024-08-05 RX ORDER — NIFEDIPINE 10 MG/1
10 CAPSULE ORAL ONCE AS NEEDED
Status: DISCONTINUED | OUTPATIENT
Start: 2024-08-05 | End: 2024-08-06

## 2024-08-05 RX ORDER — NALBUPHINE HYDROCHLORIDE 10 MG/ML
10 INJECTION, SOLUTION INTRAMUSCULAR; INTRAVENOUS; SUBCUTANEOUS
Status: DISCONTINUED | OUTPATIENT
Start: 2024-08-05 | End: 2024-08-06

## 2024-08-05 RX ORDER — TERBUTALINE SULFATE 1 MG/ML
0.25 INJECTION SUBCUTANEOUS ONCE AS NEEDED
Status: DISCONTINUED | OUTPATIENT
Start: 2024-08-05 | End: 2024-08-06

## 2024-08-05 RX ORDER — FAMOTIDINE 20 MG/1
20 TABLET, FILM COATED ORAL 2 TIMES DAILY
COMMUNITY

## 2024-08-05 RX ORDER — TRANEXAMIC ACID 100 MG/ML
1000 INJECTION, SOLUTION INTRAVENOUS ONCE AS NEEDED
Status: DISCONTINUED | OUTPATIENT
Start: 2024-08-05 | End: 2024-08-06

## 2024-08-05 SDOH — SOCIAL STABILITY: SOCIAL INSECURITY: DOES ANYONE TRY TO KEEP YOU FROM HAVING/CONTACTING OTHER FRIENDS OR DOING THINGS OUTSIDE YOUR HOME?: NO

## 2024-08-05 SDOH — SOCIAL STABILITY: SOCIAL INSECURITY: HAS ANYONE EVER THREATENED TO HURT YOUR FAMILY OR YOUR PETS?: NO

## 2024-08-05 SDOH — SOCIAL STABILITY: SOCIAL INSECURITY: ABUSE SCREEN: ADULT

## 2024-08-05 SDOH — SOCIAL STABILITY: SOCIAL INSECURITY: VERBAL ABUSE: DENIES

## 2024-08-05 SDOH — SOCIAL STABILITY: SOCIAL INSECURITY: ARE THERE ANY APPARENT SIGNS OF INJURIES/BEHAVIORS THAT COULD BE RELATED TO ABUSE/NEGLECT?: NO

## 2024-08-05 SDOH — HEALTH STABILITY: MENTAL HEALTH: SUICIDAL BEHAVIOR (LIFETIME): NO

## 2024-08-05 SDOH — HEALTH STABILITY: MENTAL HEALTH: WERE YOU ABLE TO COMPLETE ALL THE BEHAVIORAL HEALTH SCREENINGS?: YES

## 2024-08-05 SDOH — HEALTH STABILITY: MENTAL HEALTH: HAVE YOU USED ANY SUBSTANCES (CANABIS, COCAINE, HEROIN, HALLUCINOGENS, INHALANTS, ETC.) IN THE PAST 12 MONTHS?: NO

## 2024-08-05 SDOH — HEALTH STABILITY: MENTAL HEALTH: WISH TO BE DEAD (PAST 1 MONTH): NO

## 2024-08-05 SDOH — HEALTH STABILITY: MENTAL HEALTH: NON-SPECIFIC ACTIVE SUICIDAL THOUGHTS (PAST 1 MONTH): NO

## 2024-08-05 SDOH — HEALTH STABILITY: MENTAL HEALTH: HAVE YOU USED ANY PRESCRIPTION DRUGS OTHER THAN PRESCRIBED IN THE PAST 12 MONTHS?: NO

## 2024-08-05 SDOH — SOCIAL STABILITY: SOCIAL INSECURITY: DO YOU FEEL ANYONE HAS EXPLOITED OR TAKEN ADVANTAGE OF YOU FINANCIALLY OR OF YOUR PERSONAL PROPERTY?: NO

## 2024-08-05 SDOH — SOCIAL STABILITY: SOCIAL INSECURITY: PHYSICAL ABUSE: DENIES

## 2024-08-05 SDOH — SOCIAL STABILITY: SOCIAL INSECURITY: HAVE YOU HAD ANY THOUGHTS OF HARMING ANYONE ELSE?: NO

## 2024-08-05 SDOH — ECONOMIC STABILITY: HOUSING INSECURITY: DO YOU FEEL UNSAFE GOING BACK TO THE PLACE WHERE YOU ARE LIVING?: NO

## 2024-08-05 SDOH — SOCIAL STABILITY: SOCIAL INSECURITY: ARE YOU OR HAVE YOU BEEN THREATENED OR ABUSED PHYSICALLY, EMOTIONALLY, OR SEXUALLY BY ANYONE?: NO

## 2024-08-05 SDOH — SOCIAL STABILITY: SOCIAL INSECURITY: HAVE YOU HAD THOUGHTS OF HARMING ANYONE ELSE?: NO

## 2024-08-05 ASSESSMENT — PAIN SCALES - GENERAL: PAINLEVEL_OUTOF10: 0 - NO PAIN

## 2024-08-05 ASSESSMENT — ACTIVITIES OF DAILY LIVING (ADL): LACK_OF_TRANSPORTATION: NO

## 2024-08-05 ASSESSMENT — LIFESTYLE VARIABLES
AUDIT-C TOTAL SCORE: 0
AUDIT-C TOTAL SCORE: 0
SKIP TO QUESTIONS 9-10: 1
HOW MANY STANDARD DRINKS CONTAINING ALCOHOL DO YOU HAVE ON A TYPICAL DAY: PATIENT DOES NOT DRINK
HOW OFTEN DO YOU HAVE 6 OR MORE DRINKS ON ONE OCCASION: NEVER
HOW OFTEN DO YOU HAVE A DRINK CONTAINING ALCOHOL: NEVER

## 2024-08-05 ASSESSMENT — PATIENT HEALTH QUESTIONNAIRE - PHQ9
2. FEELING DOWN, DEPRESSED OR HOPELESS: NOT AT ALL
1. LITTLE INTEREST OR PLEASURE IN DOING THINGS: NOT AT ALL
SUM OF ALL RESPONSES TO PHQ9 QUESTIONS 1 & 2: 0

## 2024-08-06 ENCOUNTER — ANESTHESIA EVENT (OUTPATIENT)
Dept: OBSTETRICS AND GYNECOLOGY | Facility: HOSPITAL | Age: 39
End: 2024-08-06
Payer: COMMERCIAL

## 2024-08-06 ENCOUNTER — ANESTHESIA (OUTPATIENT)
Dept: OBSTETRICS AND GYNECOLOGY | Facility: HOSPITAL | Age: 39
End: 2024-08-06
Payer: COMMERCIAL

## 2024-08-06 PROBLEM — E66.9 OBESITY: Status: ACTIVE | Noted: 2024-08-06

## 2024-08-06 LAB
ABO GROUP (TYPE) IN BLOOD: NORMAL
ANTIBODY SCREEN: NORMAL
ERYTHROCYTE [DISTWIDTH] IN BLOOD BY AUTOMATED COUNT: 13.6 % (ref 11.5–14.5)
HCT VFR BLD AUTO: 30.4 % (ref 36–46)
HGB BLD-MCNC: 9.7 G/DL (ref 12–16)
MCH RBC QN AUTO: 25.5 PG (ref 26–34)
MCHC RBC AUTO-ENTMCNC: 31.9 G/DL (ref 32–36)
MCV RBC AUTO: 80 FL (ref 80–100)
NRBC BLD-RTO: 0 /100 WBCS (ref 0–0)
PLATELET # BLD AUTO: 389 X10*3/UL (ref 150–450)
RBC # BLD AUTO: 3.81 X10*6/UL (ref 4–5.2)
RH FACTOR (ANTIGEN D): NORMAL
TREPONEMA PALLIDUM IGG+IGM AB [PRESENCE] IN SERUM OR PLASMA BY IMMUNOASSAY: NONREACTIVE
WBC # BLD AUTO: 10.3 X10*3/UL (ref 4.4–11.3)

## 2024-08-06 PROCEDURE — 2500000004 HC RX 250 GENERAL PHARMACY W/ HCPCS (ALT 636 FOR OP/ED): Performed by: NURSE ANESTHETIST, CERTIFIED REGISTERED

## 2024-08-06 PROCEDURE — 86780 TREPONEMA PALLIDUM: CPT | Performed by: STUDENT IN AN ORGANIZED HEALTH CARE EDUCATION/TRAINING PROGRAM

## 2024-08-06 PROCEDURE — 2500000001 HC RX 250 WO HCPCS SELF ADMINISTERED DRUGS (ALT 637 FOR MEDICARE OP)

## 2024-08-06 PROCEDURE — 2500000005 HC RX 250 GENERAL PHARMACY W/O HCPCS: Performed by: NURSE ANESTHETIST, CERTIFIED REGISTERED

## 2024-08-06 PROCEDURE — 51701 INSERT BLADDER CATHETER: CPT

## 2024-08-06 PROCEDURE — 0UQMXZZ REPAIR VULVA, EXTERNAL APPROACH: ICD-10-PCS | Performed by: SPECIALIST

## 2024-08-06 PROCEDURE — 2500000005 HC RX 250 GENERAL PHARMACY W/O HCPCS: Performed by: STUDENT IN AN ORGANIZED HEALTH CARE EDUCATION/TRAINING PROGRAM

## 2024-08-06 PROCEDURE — 3700000014 EPIDURAL BLOCK: Performed by: NURSE ANESTHETIST, CERTIFIED REGISTERED

## 2024-08-06 PROCEDURE — 59050 FETAL MONITOR W/REPORT: CPT

## 2024-08-06 PROCEDURE — 59409 OBSTETRICAL CARE: CPT | Performed by: SPECIALIST

## 2024-08-06 PROCEDURE — 86923 COMPATIBILITY TEST ELECTRIC: CPT

## 2024-08-06 PROCEDURE — 7210000002 HC LABOR PER HOUR

## 2024-08-06 PROCEDURE — 86901 BLOOD TYPING SEROLOGIC RH(D): CPT | Performed by: STUDENT IN AN ORGANIZED HEALTH CARE EDUCATION/TRAINING PROGRAM

## 2024-08-06 PROCEDURE — 2500000001 HC RX 250 WO HCPCS SELF ADMINISTERED DRUGS (ALT 637 FOR MEDICARE OP): Performed by: STUDENT IN AN ORGANIZED HEALTH CARE EDUCATION/TRAINING PROGRAM

## 2024-08-06 PROCEDURE — 7100000016 HC LABOR RECOVERY PER HOUR

## 2024-08-06 PROCEDURE — 85027 COMPLETE CBC AUTOMATED: CPT | Performed by: STUDENT IN AN ORGANIZED HEALTH CARE EDUCATION/TRAINING PROGRAM

## 2024-08-06 PROCEDURE — 01967 NEURAXL LBR ANES VAG DLVR: CPT | Performed by: ANESTHESIOLOGY

## 2024-08-06 PROCEDURE — 36415 COLL VENOUS BLD VENIPUNCTURE: CPT | Performed by: STUDENT IN AN ORGANIZED HEALTH CARE EDUCATION/TRAINING PROGRAM

## 2024-08-06 PROCEDURE — 10907ZC DRAINAGE OF AMNIOTIC FLUID, THERAPEUTIC FROM PRODUCTS OF CONCEPTION, VIA NATURAL OR ARTIFICIAL OPENING: ICD-10-PCS | Performed by: OBSTETRICS & GYNECOLOGY

## 2024-08-06 PROCEDURE — 1100000001 HC PRIVATE ROOM DAILY

## 2024-08-06 PROCEDURE — 2500000004 HC RX 250 GENERAL PHARMACY W/ HCPCS (ALT 636 FOR OP/ED): Performed by: STUDENT IN AN ORGANIZED HEALTH CARE EDUCATION/TRAINING PROGRAM

## 2024-08-06 RX ORDER — METHYLERGONOVINE MALEATE 0.2 MG/ML
0.2 INJECTION INTRAVENOUS ONCE AS NEEDED
Status: DISCONTINUED | OUTPATIENT
Start: 2024-08-06 | End: 2024-08-08 | Stop reason: HOSPADM

## 2024-08-06 RX ORDER — ADHESIVE BANDAGE
10 BANDAGE TOPICAL
Status: DISCONTINUED | OUTPATIENT
Start: 2024-08-06 | End: 2024-08-08 | Stop reason: HOSPADM

## 2024-08-06 RX ORDER — POLYETHYLENE GLYCOL 3350 17 G/17G
17 POWDER, FOR SOLUTION ORAL 2 TIMES DAILY PRN
Status: DISCONTINUED | OUTPATIENT
Start: 2024-08-06 | End: 2024-08-08 | Stop reason: HOSPADM

## 2024-08-06 RX ORDER — LOPERAMIDE HYDROCHLORIDE 2 MG/1
4 CAPSULE ORAL EVERY 2 HOUR PRN
Status: DISCONTINUED | OUTPATIENT
Start: 2024-08-06 | End: 2024-08-08 | Stop reason: HOSPADM

## 2024-08-06 RX ORDER — OXYTOCIN/0.9 % SODIUM CHLORIDE 30/500 ML
60 PLASTIC BAG, INJECTION (ML) INTRAVENOUS ONCE AS NEEDED
Status: DISCONTINUED | OUTPATIENT
Start: 2024-08-06 | End: 2024-08-08 | Stop reason: HOSPADM

## 2024-08-06 RX ORDER — MISOPROSTOL 200 UG/1
800 TABLET ORAL ONCE AS NEEDED
Status: DISCONTINUED | OUTPATIENT
Start: 2024-08-06 | End: 2024-08-08 | Stop reason: HOSPADM

## 2024-08-06 RX ORDER — FENTANYL/BUPIVACAINE/NS/PF 2MCG/ML-.1
PLASTIC BAG, INJECTION (ML) INJECTION AS NEEDED
Status: DISCONTINUED | OUTPATIENT
Start: 2024-08-06 | End: 2024-08-06

## 2024-08-06 RX ORDER — FENTANYL CITRATE 50 UG/ML
INJECTION, SOLUTION INTRAMUSCULAR; INTRAVENOUS AS NEEDED
Status: DISCONTINUED | OUTPATIENT
Start: 2024-08-06 | End: 2024-08-06

## 2024-08-06 RX ORDER — LIDOCAINE 560 MG/1
1 PATCH PERCUTANEOUS; TOPICAL; TRANSDERMAL
Status: DISCONTINUED | OUTPATIENT
Start: 2024-08-06 | End: 2024-08-08 | Stop reason: HOSPADM

## 2024-08-06 RX ORDER — FENTANYL/BUPIVACAINE/NS/PF 2MCG/ML-.1
PLASTIC BAG, INJECTION (ML) INJECTION CONTINUOUS PRN
Status: DISCONTINUED | OUTPATIENT
Start: 2024-08-06 | End: 2024-08-06

## 2024-08-06 RX ORDER — ACETAMINOPHEN 325 MG/1
975 TABLET ORAL EVERY 6 HOURS
Status: DISCONTINUED | OUTPATIENT
Start: 2024-08-06 | End: 2024-08-08 | Stop reason: HOSPADM

## 2024-08-06 RX ORDER — IBUPROFEN 600 MG/1
600 TABLET ORAL EVERY 6 HOURS
Status: DISCONTINUED | OUTPATIENT
Start: 2024-08-06 | End: 2024-08-08 | Stop reason: HOSPADM

## 2024-08-06 RX ORDER — ENOXAPARIN SODIUM 100 MG/ML
60 INJECTION SUBCUTANEOUS EVERY 24 HOURS
Status: DISCONTINUED | OUTPATIENT
Start: 2024-08-07 | End: 2024-08-08 | Stop reason: HOSPADM

## 2024-08-06 RX ORDER — NIFEDIPINE 10 MG/1
10 CAPSULE ORAL ONCE AS NEEDED
Status: DISCONTINUED | OUTPATIENT
Start: 2024-08-06 | End: 2024-08-08 | Stop reason: HOSPADM

## 2024-08-06 RX ORDER — TRANEXAMIC ACID 100 MG/ML
1000 INJECTION, SOLUTION INTRAVENOUS ONCE AS NEEDED
Status: DISCONTINUED | OUTPATIENT
Start: 2024-08-06 | End: 2024-08-08 | Stop reason: HOSPADM

## 2024-08-06 RX ORDER — ONDANSETRON 4 MG/1
4 TABLET, FILM COATED ORAL EVERY 6 HOURS PRN
Status: DISCONTINUED | OUTPATIENT
Start: 2024-08-06 | End: 2024-08-08 | Stop reason: HOSPADM

## 2024-08-06 RX ORDER — DIPHENHYDRAMINE HCL 25 MG
25 CAPSULE ORAL EVERY 6 HOURS PRN
Status: DISCONTINUED | OUTPATIENT
Start: 2024-08-06 | End: 2024-08-08 | Stop reason: HOSPADM

## 2024-08-06 RX ORDER — FAMOTIDINE 20 MG/1
20 TABLET, FILM COATED ORAL 2 TIMES DAILY PRN
Status: DISCONTINUED | OUTPATIENT
Start: 2024-08-06 | End: 2024-08-06

## 2024-08-06 RX ORDER — OXYTOCIN 10 [USP'U]/ML
10 INJECTION, SOLUTION INTRAMUSCULAR; INTRAVENOUS ONCE AS NEEDED
Status: DISCONTINUED | OUTPATIENT
Start: 2024-08-06 | End: 2024-08-08 | Stop reason: HOSPADM

## 2024-08-06 RX ORDER — HYDRALAZINE HYDROCHLORIDE 20 MG/ML
5 INJECTION INTRAMUSCULAR; INTRAVENOUS ONCE AS NEEDED
Status: DISCONTINUED | OUTPATIENT
Start: 2024-08-06 | End: 2024-08-08 | Stop reason: HOSPADM

## 2024-08-06 RX ORDER — BISACODYL 10 MG/1
10 SUPPOSITORY RECTAL DAILY PRN
Status: DISCONTINUED | OUTPATIENT
Start: 2024-08-06 | End: 2024-08-08 | Stop reason: HOSPADM

## 2024-08-06 RX ORDER — LABETALOL HYDROCHLORIDE 5 MG/ML
20 INJECTION, SOLUTION INTRAVENOUS ONCE AS NEEDED
Status: DISCONTINUED | OUTPATIENT
Start: 2024-08-06 | End: 2024-08-08 | Stop reason: HOSPADM

## 2024-08-06 RX ORDER — CARBOPROST TROMETHAMINE 250 UG/ML
250 INJECTION, SOLUTION INTRAMUSCULAR ONCE AS NEEDED
Status: DISCONTINUED | OUTPATIENT
Start: 2024-08-06 | End: 2024-08-08 | Stop reason: HOSPADM

## 2024-08-06 RX ORDER — FAMOTIDINE 20 MG/1
20 TABLET, FILM COATED ORAL 2 TIMES DAILY
Status: DISCONTINUED | OUTPATIENT
Start: 2024-08-06 | End: 2024-08-08 | Stop reason: HOSPADM

## 2024-08-06 RX ORDER — ONDANSETRON HYDROCHLORIDE 2 MG/ML
4 INJECTION, SOLUTION INTRAVENOUS EVERY 6 HOURS PRN
Status: DISCONTINUED | OUTPATIENT
Start: 2024-08-06 | End: 2024-08-08 | Stop reason: HOSPADM

## 2024-08-06 RX ORDER — DIPHENHYDRAMINE HYDROCHLORIDE 50 MG/ML
25 INJECTION INTRAMUSCULAR; INTRAVENOUS EVERY 6 HOURS PRN
Status: DISCONTINUED | OUTPATIENT
Start: 2024-08-06 | End: 2024-08-08 | Stop reason: HOSPADM

## 2024-08-06 RX ORDER — SIMETHICONE 80 MG
80 TABLET,CHEWABLE ORAL 4 TIMES DAILY PRN
Status: DISCONTINUED | OUTPATIENT
Start: 2024-08-06 | End: 2024-08-08 | Stop reason: HOSPADM

## 2024-08-06 SDOH — HEALTH STABILITY: MENTAL HEALTH: CURRENT SMOKER: 0

## 2024-08-06 ASSESSMENT — PAIN SCALES - GENERAL
PAINLEVEL_OUTOF10: 1
PAINLEVEL_OUTOF10: 0 - NO PAIN
PAINLEVEL_OUTOF10: 2
PAINLEVEL_OUTOF10: 0 - NO PAIN
PAINLEVEL_OUTOF10: 0 - NO PAIN
PAINLEVEL_OUTOF10: 2
PAINLEVEL_OUTOF10: 0 - NO PAIN

## 2024-08-06 ASSESSMENT — PAIN DESCRIPTION - LOCATION: LOCATION: HEAD

## 2024-08-06 ASSESSMENT — PAIN DESCRIPTION - DESCRIPTORS: DESCRIPTORS: CRAMPING

## 2024-08-06 NOTE — PROGRESS NOTES
Intrapartum Progress Note    Assessment/Plan   Deena Art is a 38 y.o.  at 38w5d. ARABELLA: 8/15/2024, by Ultrasound here for IOL iso decreased fetal movement.     IOL  Labor Course  2230 3/30/-3  0012 started pit   0610 /-3 AROM for clear   1015 /-3  Continue pitocin and monitor for cervical change     Decreased fetal movement  Resolved in triage, FHT reassuring     Pregnancy notables:   GBS Bacteriuria, on penicillin  Prior c/f low lying placenta --> resolved on 28wk US  Hx CS in first pregnancy, VBACx4 afterwards, MFMU 80.9%    Seen and discussed with Dr. Vinita Infante MD, PGY-1    Subjective   Patient reports feeling well with epidural and pitocin infusing. Feeling more pressure and would like SVE now.    Objective   Last Vitals:  Temp Pulse Resp BP MAP Pulse Ox   36.2 °C (97.2 °F) 68 18 119/74 91 98 %     Vitals Min/Max Last 24 Hours:  Temp  Min: 35.9 °C (96.6 °F)  Max: 36.3 °C (97.3 °F)  Pulse  Min: 58  Max: 96  Resp  Min: 16  Max: 18  BP  Min: 109/71  Max: 142/85  MAP (mmHg)  Min: 83  Max: 111    Intake/Output:    Intake/Output Summary (Last 24 hours) at 2024 0945  Last data filed at 2024 0754  Gross per 24 hour   Intake --   Output 650 ml   Net -650 ml       Physical Examination:  Constitutional: No acute distress, alert and cooperative  Head/Neck: Normocephalic  Respiratory: Normal respiratory effort on RA, no increased WOB  Cardiovascular: warm and well perfused  Gastrointestinal: gravid  Musculoskeletal: grossly normal ROM  Extremities: No LE edema  Neurological: alert, oriented, no gross deficit  Psychological: Appropriate mood and behavior     FHR is 120 baseline, with mod variability, + accels, - decels , and a category I tracing.    [place] reading: q2-4 mins    Lab Review:  Labs in chart were reviewed.

## 2024-08-06 NOTE — ANESTHESIA PREPROCEDURE EVALUATION
Patient: Deena Art    Evaluation Method: In-person visit    Procedure Information    Date: 24  Procedure: Labor Analgesia         Relevant Problems   Anesthesia  Epidural without issue; GA for c/s without issue      Cardiac (within normal limits)      Pulmonary (within normal limits)      Neuro (within normal limits)      GI (within normal limits)      /Renal (within normal limits)      Liver (within normal limits)      Endocrine   (+) Obesity      Hematology (within normal limits)      Musculoskeletal (within normal limits)      GYN   (+) 38 weeks gestation of pregnancy ([facility])       Clinical information reviewed:   Tobacco  Allergies  Meds   Med Hx  Surg Hx   Fam Hx          NPO Detail:  No data recorded     OB/Gyn Evaluation    Present Pregnancy    Pregnant now:  @38.5 IOL for DFM; VBACx4.  (+) , previous  section   Obstetric History    (+)  section - primary, vaginal birth after               Physical Exam    Airway  Mallampati: III  TM distance: >3 FB     Cardiovascular    Dental        Pulmonary    Abdominal          Vitals:    24 0432   BP:    Pulse:    Resp:    Temp: 36 °C (96.8 °F)   SpO2:      128/70  Hr 70s  Anesthesia Plan    History of general anesthesia?: yes  History of complications of general anesthesia?: no    ASA 3     epidural     The patient is not a current smoker.    Anesthetic plan and risks discussed with patient.  Use of blood products discussed with patient who consented to blood products.    Plan discussed with attending.

## 2024-08-06 NOTE — CARE PLAN
The patient's goals for the shift include safe delivery      The clinical goals for the shift include reassuring FHR during shift      Problem: Vaginal Birth or  Section  Goal: Fetal and maternal status remain reassuring during the birth process  Outcome: Progressing  Goal: Prevention of malpresentation/labor dystocia through delivery  Outcome: Progressing  Goal: Demonstrates labor coping techniques through delivery  Outcome: Progressing  Goal: Minimal s/sx of HDP and BP<160/110  Outcome: Progressing     Problem: Pain - Adult  Goal: Verbalizes/displays adequate comfort level or baseline comfort level  Outcome: Progressing     Problem: Safety - Adult  Goal: Free from fall injury  Outcome: Progressing

## 2024-08-06 NOTE — L&D DELIVERY NOTE
OB Delivery Note  2024  Deena Art  38 y.o.   Vaginal, Spontaneous       Gestational Age: 38w5d  /Para:   Quantitative Blood Loss: Admission to Discharge: 150 mL (2024  8:31 PM - 2024  4:41 PM)    Kevyn Art [[id number]]      Labor Events    Sac identifier: Sac 1  Rupture date/time: 2024 0607  Rupture type: Artificial  Fluid color: Clear  Fluid odor: None  Labor type: Induced Onset of Labor  Labor allowed to proceed with plans for an attempted vaginal birth?: Yes  Induction: Oxytocin  Induction date/time: 2024 0012  Complications: None       Labor Event Times    Labor onset date/time: 2024  Dilation complete date/time: 2024 141  Start pushing date/time: 2024       Labor Length    1st stage: 17h 45m  2nd stage: 0h 09m  3rd stage: 0h 05m       Placenta    Placenta delivery date/time: 2024 1430  Placenta removal: Expressed  Placenta appearance: Intact  Placenta disposition: discarded       Cord    Vessels: 3 vessels  Complications: None  Delayed cord clamping?: Yes  Cord clamped date/time: 2024 14:17:00  Cord blood disposition: Discarded  Gases sent?: No  Stem cell collection (by provider): No       Lacerations    Episiotomy: None  Perineal laceration: None  Periurethral laceration?: Yes  Periurethral laceration location: right  Periurethral laceration repaired?: Yes  Repair suture: 4-0 Synthetic Suture       Anesthesia    Method: Epidural       Operative Delivery    Forceps attempted?: No  Vacuum extractor attempted?: No       Shoulder Dystocia    Shoulder dystocia present?: No        Delivery    Time head delivered: 2024 14:25:00  Birth date/time: 2024 14:25:00  Delivery type: Vaginal, Spontaneous  Complications: None       Resuscitation    Method: Tactile stimulation, Suctioning       Apgars    Living status: Living  Apgar Component Scores:  1 min.:  5 min.:  10 min.:  15 min.:  20 min.:    Skin color:  0  1       Heart rate:  2   2       Reflex irritability:  2  2       Muscle tone:  2  2       Respiratory effort:  2  2       Total:  8  9       Apgars assigned by: NATALIE WILSON       Delivery Providers    Delivering clinician: Nicky Wiseman MD   Provider Role    Alicia Norwood RN Delivery Nurse    Natalie Wilson RN Nursery Nurse    Char Infante MD Resident                 Char Infante MD

## 2024-08-06 NOTE — SIGNIFICANT EVENT
LABOR PROGRESS NOTE  SUBJECTIVE  Patient doing well with epidural and pitocin infusing. Feeling more pressure and desires re-check.    OBJECTIVE  Visit Vitals  BP (!) 145/78   Pulse 69   Temp 36.5 °C (97.7 °F) (Temporal)   Resp 18        Cervical Exam  Dilation: 6  Effacement (%): 70  Fetal Station: -3  Method: Manual  OB Examiner: golden infante MD  Fetal Assessment  Movement: Present  Mode: External US  Baseline Fetal Heart Rate (bpm): 125 bpm  Baseline Classification: Normal  Variability: Moderate (Between 6 and 25 BPM)  Pattern: Accelerations  Pattern Observations: pt up to bathroom at 0435. spotty tracing after d/t pt sitting for epidural  FHR Category: Category I  Multiple Births: No      Contraction Frequency: 2-5    A&P    IOL  - Continue to monitor for cervical change  - Continue pitocin  - Labor Course  2230 3/30/-3  0012 started pit   0610 AROM for clear, 4/70/-3  1015 5/70/-3  1150 6/70/-3    Char Infante MD, PGY-1

## 2024-08-06 NOTE — ANESTHESIA PROCEDURE NOTES
Epidural Block    Patient location during procedure: OB  Start time: 8/6/2024 4:54 AM  End time: 8/6/2024 5:22 AM  Reason for block: labor analgesia  Staffing  Performed: CRNA   Authorized by: JAMES Garcia    Performed by: JAMES Garcia    Preanesthetic Checklist  Completed: patient identified, IV checked, site marked, risks and benefits discussed, surgical consent, monitors and equipment checked, pre-op evaluation, timeout performed and sterile techniques followed  Block Timeout  RN/Licensed healthcare professional reads aloud to the Anesthesia provider and entire team: Patient identity, procedure with side and site, patient position, and as applicable the availability of implants/special equipment/special requirements.  Patient on coagulant treatment: no  Timeout performed at: 8/6/2024 4:55 AM  Block Placement  Patient position: sitting  Prep: ChloraPrep  Sterility prep: cap, drape, gloves and mask  Sedation level: no sedation  Patient monitoring: blood pressure, continuous pulse oximetry and heart rate  Approach: midline  Local numbing: lidocaine 1% to skin and subcutaneous tissues  Vertebral space: lumbar  Lumbar location: L3-L4  Epidural  Loss of resistance technique: saline  Guidance: landmark technique        Needle  Needle type: Tuohy   Needle gauge: 17  Needle length: 8.9cm  Needle insertion depth: 6 cm  Catheter type: multi-orifice  Catheter size: 19 G  Catheter at skin depth: 11 cm  Catheter securement method: liquid medical adhesive and clear occlusive dressing    Test dose: lidocaine 1.5% with epinephrine 1-to-200,000  Test dose: lidocaine 1.5% with epinephrine 1-to-200,000  Test dose result: no positive test dose    PCEA  Medication concentration used: 0.044% Bupivacaine with 1.25 mcg/mL Fentanyl and [id number] Epinephrine  Dose (mL): 10  Lockout (minutes): 15  1-Hour Limit (boluses/hr): 4  Basal Rate: 14        Assessment  Block outcome: pain improved  Number of attempts:  2  Events: no positive test dose  Procedure assessment: patient tolerated procedure well with no immediate complications  Additional Notes  First placement uneventful. Before test dose, the catheter off sterile field. Removed catheter, tip intact. Procedure restarted, and second catheter placed without incident.

## 2024-08-06 NOTE — SIGNIFICANT EVENT
"Labor Progress Note    Subjective: Pt resting comfortably in bed, epidural infusing. Her contractions are much improved with the epidural and she is amenable to AROM.     Objective:  Vitals: /74   Pulse 74   Temp 36 °C (96.8 °F)   Resp 16   Ht 1.549 m (5' 1\")   Wt 107 kg (235 lb 7.2 oz)   LMP 12/04/2023   SpO2 98%   BMI 44.49 kg/m²   SVE: 4/70/-3  FHT: 135/mod/+accels/-decels  [place]: q2-4m  AROM for clear, tinged with blood    A/P:    IOL  Continue pitocin per protocol, continue PCN  CEFM, currently Category I  Epidural infusing    Kelly Hung, M4  Patient seen and evaluated with medical student. Agree with assessment above, edits made within text.  Edd Becker, PGY4 OBGYN  Seen and discussed with Dr. Alfonso      "

## 2024-08-06 NOTE — DISCHARGE INSTRUCTIONS

## 2024-08-06 NOTE — H&P
Obstetrical Admission History and Physical    Assessment/Plan    Deena Art is a 38 y.o.  at 38w4d, ARABELLA: 8/15/2024, by LMP c/w 7wk US admitted for IOL, iso decreased fetal movement.    IOL  Admitted, consented, scanned, cephalic  Will induce with pitocin per protocol, for AROM when appropriate  Epidural as requested  GBS positive (bacteriuria ), on PCN  EFW 3079g (52%ile), AC 74%ile () --> extrapolated 3329g  CEFM, currently Cat I  Delivery plan: patient desires vaginal delivery, patient counseled on risks of labor, vaginal delivery, and possibility of C/S for varying maternal or fetal indications    Decreased fetal movement  Resolved in triage, FHT reassuring    Pregnancy notables:   GBS Bacteriuria  Prior c/f low lying placenta --> resolved on 28wk US  Hx CS in first pregnancy, VBACx4 since, MFMU 80.9%    Postpartum planning:  Contraception: undecided    Kelly Hung, M4  Patient seen and evaluated with medical student. Agree with assessment above, edits made within text.  Edd Becker, PGY4 OBGYN  Seen and discussed with Dr. Liliam Otto   Deena Art is a 37yo  at 38w5d by 7wk US, presenting with decreased fetal movement and lower extremity swelling. Since this morning, she has not been feeling her baseline amount of movement from the baby. She reports it has improved in triage. Pt reports copious vaginal discharge, but no large leakage of fluid. She endorses irregular contractions. Denies vaginal bleeding.   Denies f/c, cough, n/v, SOB, HA, vision changes.    Pregnancy notable for:  Medical Problems       Problem List       * (Principal) 38 weeks gestation of pregnancy ([facility])          Obstetrical History   OB History    Para Term  AB Living   6 5 5     5   SAB IAB Ectopic Multiple Live Births           5      # Outcome Date GA Lbr Julio/2nd Weight Sex Type Anes PTL Lv   6 Current            5 Term 2019   3.317 kg F Vag-Spont      4 Term    3.374 kg  M Vag-Spont   JOSE ANGEL   3 Term    2.863 kg F Vag-Spont   JOSE ANGEL   2 Term    3.402 kg F Vag-Spont   JOSE ANGEL   1 Term    3.317 kg M CS-LTranv   JOSE ANGEL       Past Medical History  Past Medical History:   Diagnosis Date    Encounter for gynecological examination (general) (routine) without abnormal findings     Pap test, as part of routine gynecological examination    Encounter for routine postpartum follow-up ([facility]) 2022    Postpartum care following vaginal delivery    Encounter for supervision of normal pregnancy, unspecified, unspecified trimester ([facility]) 2018    Pregnancy at early stage    Mammary duct ectasia of left breast 2015    Duct ectasia of breast, left    Personal history of diseases of the skin and subcutaneous tissue 10/28/2014    History of cyst of breast    Personal history of other specified conditions 2015    History of breast lump        Past Surgical History   Past Surgical History:   Procedure Laterality Date     SECTION, CLASSIC  2014     Section       Social History  Social History     Tobacco Use    Smoking status: Never    Smokeless tobacco: Never   Substance Use Topics    Alcohol use: Never     Substance and Sexual Activity   Drug Use Never       Allergies  Patient has no known allergies.     Medications  Medications Prior to Admission   Medication Sig Dispense Refill Last Dose    prenatal vitamin, iron-folic, 27 mg iron-800 mcg folic acid tablet Take 1 tablet by mouth once daily. 90 tablet 3        Objective    Last Vitals  Temp Pulse Resp BP MAP O2 Sat   36.3 °C (97.3 °F) 91 18 124/60   98 %     Physical Examination  General: no acute distress  HEENT: normocephalic, atraumatic  Heart: warm and well perfused  Lungs: breathing comfortably on room air  Abdomen: gravid  Extremities: moving all extremities  Neuro: awake and conversant  Psych: appropriate mood and affect  SVE: 3/30/-3  FHT: 130/mod/+accel/-decel  [place]: q2-4min  BSUS: cephalic    Lab  Review  Labs in chart have been reviewed.

## 2024-08-07 PROBLEM — O16.3 ELEVATED BLOOD PRESSURE COMPLICATING PREGNANCY IN THIRD TRIMESTER, ANTEPARTUM (HHS-HCC): Status: ACTIVE | Noted: 2024-08-07

## 2024-08-07 PROCEDURE — 1100000001 HC PRIVATE ROOM DAILY

## 2024-08-07 PROCEDURE — 2500000001 HC RX 250 WO HCPCS SELF ADMINISTERED DRUGS (ALT 637 FOR MEDICARE OP)

## 2024-08-07 PROCEDURE — 2500000001 HC RX 250 WO HCPCS SELF ADMINISTERED DRUGS (ALT 637 FOR MEDICARE OP): Performed by: NURSE PRACTITIONER

## 2024-08-07 RX ORDER — SENNOSIDES 8.6 MG/1
1 TABLET ORAL NIGHTLY
Status: DISCONTINUED | OUTPATIENT
Start: 2024-08-07 | End: 2024-08-07

## 2024-08-07 RX ORDER — ALUMINUM HYDROXIDE, MAGNESIUM HYDROXIDE, AND SIMETHICONE 1200; 120; 1200 MG/30ML; MG/30ML; MG/30ML
10 SUSPENSION ORAL 4 TIMES DAILY PRN
Status: DISCONTINUED | OUTPATIENT
Start: 2024-08-07 | End: 2024-08-08 | Stop reason: HOSPADM

## 2024-08-07 RX ORDER — CYCLOBENZAPRINE HCL 10 MG
10 TABLET ORAL 3 TIMES DAILY PRN
Status: DISCONTINUED | OUTPATIENT
Start: 2024-08-07 | End: 2024-08-08 | Stop reason: HOSPADM

## 2024-08-07 RX ORDER — SENNOSIDES 8.6 MG/1
1 TABLET ORAL DAILY
Status: DISCONTINUED | OUTPATIENT
Start: 2024-08-07 | End: 2024-08-08 | Stop reason: HOSPADM

## 2024-08-07 ASSESSMENT — PAIN SCALES - PAIN ASSESSMENT IN ADVANCED DEMENTIA (PAINAD): TOTALSCORE: MEDICATION (SEE MAR)

## 2024-08-07 ASSESSMENT — PAIN DESCRIPTION - LOCATION: LOCATION: ABDOMEN

## 2024-08-07 ASSESSMENT — PAIN SCALES - GENERAL
PAINLEVEL_OUTOF10: 3
PAINLEVEL_OUTOF10: 5 - MODERATE PAIN
PAINLEVEL_OUTOF10: 3
PAINLEVEL_OUTOF10: 5 - MODERATE PAIN
PAINLEVEL_OUTOF10: 2

## 2024-08-07 ASSESSMENT — PAIN DESCRIPTION - DESCRIPTORS
DESCRIPTORS: SORE
DESCRIPTORS: DISCOMFORT;SORE
DESCRIPTORS: DISCOMFORT;SORE

## 2024-08-07 ASSESSMENT — PAIN DESCRIPTION - ORIENTATION: ORIENTATION: LOWER

## 2024-08-07 NOTE — CARE PLAN
The patient's goals for the shift include   Problem: Safety - Adult  Goal: Free from fall injury  Outcome: Progressing     Problem: Postpartum  Goal: Experiences normal postpartum course  Outcome: Progressing  Goal: Appropriate maternal -  bonding  Outcome: Progressing           VSS, bleeding and swelling minimal, pain controlled with medications, IV removed, formula feeding.

## 2024-08-07 NOTE — ANESTHESIA POSTPROCEDURE EVALUATION
Patient: Deena Art is a 38 y.o., , who had a Vaginal, Spontaneous delivery on 2024 at 38w5d and is now POD1.    She had Neuraxial Anesthesia without immediate complications noted.       Pain well controlled    Vitals:    24 0420   BP: 117/67   Pulse: 59   Resp: 16   Temp: 36.2 °C (97.2 °F)   SpO2: 97%       Neuraxial site assessed. No visible redness or swelling or drainage. Approximately 1 inch round purple bruise noted at site. Patient able to ambulate and move all extremities without difficulty. Able to void. No complaints of nausea/vomiting. Tolerating PO intake well. No s/sx of PDPH. Previous history of PDPH. Pt requests anesthesia team member round on  to check on her and confirm no PDPH.    Anesthesia will continue to follow patient     Mark Wang, CAA

## 2024-08-07 NOTE — PROGRESS NOTES
Postpartum Progress Note    Assessment/Plan   Deena Art is a 38 y.o., , who delivered at 38w5d gestation    Now PPD#1 s/p Vaginal, Spontaneous on 2024  - Continue routine postpartum care  - Pain well controlled on po medications, added Flexeril for back pain  - DVT risk score DVT Score: 5 , ppx with Lovenox  - Rh positive, no indication for Rhogam  -  cc    - Hgb:   Results from last 7 days   Lab Units 24  0016   HEMOGLOBIN g/dL 9.7*      Elevated BP  - during epidural  - no dx of HDP at this time  - asymptomatic  - normotensive postpartum    Anemia  - increased fatigue  - plan PO iron every other day at discharge  - encouraged iron rich foods    Maternal Well-Being  - Vitals stable  - All questions and concerns address     Feeding  - Breastfeeding/pumping encouraged  - Lactation consult prn    Contraception  - considering interval IUD  - Defers contraception to primary OB/PP visit. We discussed pregnancy spacing of at least one year, abstaining from intercourse for 6wks, and the ability to become pregnant in the absence of regular menses. Pt verbalized understanding.  - Encouraged to continue PNV    Dispo  - Anticipate d/c on PPD #2 if meeting all postpartum milestones  - Follow up in 4-6 wk for postpartum visit with your OB provider.     Jyotsna Coello APRN-CNP     Principal Problem:    38 weeks gestation of pregnancy ([facility])  Active Problems:    Obesity    Pregnancy Problems (from 24 to present)       Problem Noted Resolved    38 weeks gestation of pregnancy ([facility]) 2024 by Edd Becker MD No    Priority:  Medium            Hospital course: no complications    Subjective      Deena Art is PPD#1 s/p vaginal delivery who reports feeling overall well.  No acute events overnight.  Voiding spontaneously, not yet passing flatus.  Pain fairly well controlled on PO meds.  Light lochia. Tolerating diet.  Denies HA, N/V, RUQ pain, vision changes, chest pain, or SOB. and  Denies dizziness/lightheadedness/LOC/uncontrolled bleeding. Pt does report increased back aching at epidural site. Anesthesia aware. Encouraged ambulation, increased water intake, and fiber foods. Discussed positioning and stretches to relieve sciatic pain.     Objective   Allergies:   Patient has no known allergies.         Last Vitals:  Temp Pulse Resp BP MAP Pulse Ox   36.5 °C (97.7 °F) 60 17 103/68   94 %     Vitals Min/Max Last 24 Hours:  Temp  Min: 36.2 °C (97.2 °F)  Max: 37.5 °C (99.5 °F)  Pulse  Min: 59  Max: 115  Resp  Min: 16  Max: 18  BP  Min: 102/62  Max: 151/86    Intake/Output:     Intake/Output Summary (Last 24 hours) at 8/7/2024 0934  Last data filed at 8/6/2024 1710  Gross per 24 hour   Intake --   Output 580 ml   Net -580 ml       Physical Exam:  General: examination reveals a well developed, well nourished, female, in no acute distress. She is alert and cooperative.  HEENT: external ears normal. Nose normal, no erythema or discharge.  Neck: supple, no significant adenopathy  Lungs: breathing even and unlabored, lungs clear all fields  Cardiac: warm and well perfused, heart rate regular rate and rhythm, no murmur  Fundus: firm and below umbilicus, lochia light, no clots  Abdominal: soft, non tender, non-distended, bowel sounds active x 4 quads, no flatus yet  Extremities: no redness or tenderness in the calves or thighs, +1 pitting edema BLE.  Neurological: alert, oriented, normal speech, no focal findings or movement disorder noted.  Psychological: awake and alert; oriented to person, place, and time.  Skin: no rashes or lesions, 2x3 cm area of ecchymosis at epidural site; tender to palpate    Lab Data:  Lab Results   Component Value Date    ABO A 08/06/2024    LABRH POS 08/06/2024    ABSCRN NEG 08/06/2024     Lab Results   Component Value Date    WBC 10.3 08/06/2024    HGB 9.7 (L) 08/06/2024    HCT 30.4 (L) 08/06/2024     08/06/2024

## 2024-08-07 NOTE — CARE PLAN
The patient's goals for the shift include resting    The clinical goals for the shift include VSS. no s/sx of HDP, PPH, or infection      Problem: Safety - Adult  Goal: Free from fall injury  2024 by Gloria Wilson RN  Outcome: Progressing  2024 by Gloria Wilson RN  Outcome: Progressing     Problem: Postpartum  Goal: Experiences normal postpartum course  2024 by Gloria Wilson RN  Outcome: Progressing  2024 by Gloria Wilson RN  Outcome: Progressing  Goal: Appropriate maternal -  bonding  2024 by Gloria Wilson RN  Outcome: Progressing  2024 by Gloria Wilson RN  Outcome: Progressing  Goal: Establish and maintain infant feeding pattern for adequate nutrition  2024 by Gloria Wilson RN  Outcome: Progressing  2024 by Gloria Wilson RN  Outcome: Progressing  Goal: No s/sx infection  2024 by Gloria Wilson RN  Outcome: Progressing  2024 by Gloria Wilson RN  Outcome: Progressing  Goal: No s/sx of hemorrhage  2024 by Gloria Wilson RN  Outcome: Progressing  2024 by Gloria Wilson RN  Outcome: Progressing  Goal: Minimal s/sx of HDP and BP<160/110  2024 by Gloria Wilson RN  Outcome: Progressing  2024 by Gloria Wilson RN  Outcome: Progressing

## 2024-08-08 ENCOUNTER — PHARMACY VISIT (OUTPATIENT)
Dept: PHARMACY | Facility: CLINIC | Age: 39
End: 2024-08-08
Payer: COMMERCIAL

## 2024-08-08 VITALS
DIASTOLIC BLOOD PRESSURE: 72 MMHG | HEIGHT: 61 IN | RESPIRATION RATE: 16 BRPM | TEMPERATURE: 97.9 F | BODY MASS INDEX: 44.45 KG/M2 | OXYGEN SATURATION: 94 % | WEIGHT: 235.45 LBS | SYSTOLIC BLOOD PRESSURE: 111 MMHG | HEART RATE: 58 BPM

## 2024-08-08 PROBLEM — Z3A.38 38 WEEKS GESTATION OF PREGNANCY (HHS-HCC): Status: RESOLVED | Noted: 2024-08-05 | Resolved: 2024-08-08

## 2024-08-08 PROCEDURE — RXMED WILLOW AMBULATORY MEDICATION CHARGE

## 2024-08-08 PROCEDURE — 2500000001 HC RX 250 WO HCPCS SELF ADMINISTERED DRUGS (ALT 637 FOR MEDICARE OP)

## 2024-08-08 PROCEDURE — 2500000001 HC RX 250 WO HCPCS SELF ADMINISTERED DRUGS (ALT 637 FOR MEDICARE OP): Performed by: NURSE PRACTITIONER

## 2024-08-08 RX ORDER — FERROUS SULFATE 324(65)MG
325 TABLET, DELAYED RELEASE (ENTERIC COATED) ORAL EVERY OTHER DAY
Qty: 15 TABLET | Refills: 0 | Status: SHIPPED | OUTPATIENT
Start: 2024-08-08

## 2024-08-08 RX ORDER — POLYETHYLENE GLYCOL 3350 17 G/17G
17 POWDER, FOR SOLUTION ORAL 2 TIMES DAILY PRN
Qty: 238 G | Refills: 0 | Status: SHIPPED | OUTPATIENT
Start: 2024-08-08

## 2024-08-08 RX ORDER — IBUPROFEN 600 MG/1
600 TABLET ORAL EVERY 6 HOURS PRN
Qty: 60 TABLET | Refills: 0 | Status: SHIPPED | OUTPATIENT
Start: 2024-08-08

## 2024-08-08 RX ORDER — ACETAMINOPHEN 325 MG/1
975 TABLET ORAL EVERY 6 HOURS PRN
Qty: 120 TABLET | Refills: 0 | Status: SHIPPED | OUTPATIENT
Start: 2024-08-08

## 2024-08-08 ASSESSMENT — PAIN DESCRIPTION - LOCATION
LOCATION: ABDOMEN

## 2024-08-08 ASSESSMENT — PAIN DESCRIPTION - DESCRIPTORS: DESCRIPTORS: SORE;CRAMPING

## 2024-08-08 ASSESSMENT — PAIN SCALES - GENERAL
PAINLEVEL_OUTOF10: 5 - MODERATE PAIN

## 2024-08-08 NOTE — DISCHARGE SUMMARY
Discharge Summary    Admission Date: 8/5/2024  Discharge Date: 08/08/24      Discharge Diagnosis  Vaginal delivery ([facility])    Hospital Course  Delivery Date: 8/6/2024 2:25 PM  Delivery type: Vaginal, Spontaneous   GA at delivery: 38w5d  Outcome: Living  Anesthesia during delivery: Epidural  Intrapartum complications: None  Feeding method: Breastfeeding Status: No     Procedures: none  Contraception at discharge: none  Ready to go home.  No complaints or questions    Last Vitals:  Temp Pulse Resp BP MAP Pulse Ox   36.6 °C (97.9 °F) 58 16 111/72 85 94 %     Discharge Meds     Your medication list        START taking these medications        Instructions Last Dose Given Next Dose Due   acetaminophen 325 mg tablet  Commonly known as: Tylenol      Take 3 tablets (975 mg) by mouth every 6 hours if needed for mild pain (1 - 3) or moderate pain (4 - 6).       ferrous sulfate 324 mg (65 mg elemental iron) EC tablet (delayed release)      Take 1 tablet by mouth every other day. Do not crush, chew, or split.       ibuprofen 600 mg tablet      Take 1 tablet (600 mg) by mouth every 6 hours if needed for mild pain (1 - 3) or moderate pain (4 - 6).       polyethylene glycol 17 gram/dose powder  Commonly known as: Glycolax, Miralax      Take 17 g (mix 1 capful in 8 of liquid) and drink by mouth 2 times a day as needed (constipation).              CONTINUE taking these medications        Instructions Last Dose Given Next Dose Due   famotidine 20 mg tablet  Commonly known as: Pepcid           prenatal vitamin (iron-folic) 27 mg iron-800 mcg folic acid tablet      Take 1 tablet by mouth once daily.                 Where to Get Your Medications        These [facility] [id number]      Hours: 8:30 AM to 5 PM Mon-Fri Phone: [phone]   acetaminophen 325 mg tablet  ferrous sulfate 324 mg (65 mg elemental iron) EC tablet (delayed release)  ibuprofen 600 mg  tablet  polyethylene glycol 17 gram/dose powder          Complications Requiring Follow-Up  Heavy vaginal bleeding, passing clots, fever and/or chills      Test Results Pending At Discharge  Pending Labs       No current pending labs.            Outpatient Follow-Up  No future appointments.    I spent 8 minutes in the professional and overall care of this patient.      Ema De León, APRN-CNP

## 2024-08-08 NOTE — CARE PLAN
The patient's goals for the shift include to be discharged today    The clinical goals for the shift include stable vital signs this shift    VSS, voiding and passing flatus, pain well tolerated with pain medication, bleeding minimal, bottle feeding infant formula.

## 2024-08-09 ENCOUNTER — APPOINTMENT (OUTPATIENT)
Dept: OBSTETRICS AND GYNECOLOGY | Facility: CLINIC | Age: 39
End: 2024-08-09
Payer: COMMERCIAL

## 2024-08-10 LAB
BLOOD EXPIRATION DATE: NORMAL
DISPENSE STATUS: NORMAL
PRODUCT BLOOD TYPE: 6200
PRODUCT CODE: NORMAL
UNIT ABO: NORMAL
UNIT NUMBER: NORMAL
UNIT RH: NORMAL
UNIT VOLUME: 350
XM INTEP: NORMAL

## 2024-08-13 ENCOUNTER — APPOINTMENT (OUTPATIENT)
Dept: RADIOLOGY | Facility: HOSPITAL | Age: 39
End: 2024-08-13
Payer: COMMERCIAL

## 2024-08-13 ENCOUNTER — APPOINTMENT (OUTPATIENT)
Dept: CARDIOLOGY | Facility: HOSPITAL | Age: 39
End: 2024-08-13
Payer: COMMERCIAL

## 2024-08-13 ENCOUNTER — HOSPITAL ENCOUNTER (INPATIENT)
Facility: HOSPITAL | Age: 39
LOS: 2 days | Discharge: HOME | End: 2024-08-15
Attending: OBSTETRICS & GYNECOLOGY | Admitting: OBSTETRICS & GYNECOLOGY
Payer: COMMERCIAL

## 2024-08-13 ENCOUNTER — TELEPHONE (OUTPATIENT)
Dept: OBSTETRICS AND GYNECOLOGY | Facility: CLINIC | Age: 39
End: 2024-08-13
Payer: COMMERCIAL

## 2024-08-13 DIAGNOSIS — I50.9 HEART FAILURE, UNSPECIFIED HF CHRONICITY, UNSPECIFIED HEART FAILURE TYPE (MULTI): ICD-10-CM

## 2024-08-13 DIAGNOSIS — Z13.6 ENCOUNTER FOR SCREENING FOR CARDIOVASCULAR DISORDERS: ICD-10-CM

## 2024-08-13 DIAGNOSIS — I42.9 CARDIOMYOPATHY, UNSPECIFIED (MULTI): ICD-10-CM

## 2024-08-13 DIAGNOSIS — R79.89 ELEVATED BRAIN NATRIURETIC PEPTIDE (BNP) LEVEL: ICD-10-CM

## 2024-08-13 LAB
ALBUMIN SERPL BCP-MCNC: 3.1 G/DL (ref 3.4–5)
ALP SERPL-CCNC: 186 U/L (ref 33–110)
ALT SERPL W P-5'-P-CCNC: 111 U/L (ref 7–45)
ANION GAP SERPL CALC-SCNC: 14 MMOL/L (ref 10–20)
AST SERPL W P-5'-P-CCNC: 38 U/L (ref 9–39)
BILIRUB SERPL-MCNC: 0.3 MG/DL (ref 0–1.2)
BILIRUBIN, POC: NEGATIVE
BLOOD URINE, POC: POSITIVE
BNP SERPL-MCNC: 589 PG/ML (ref 0–99)
BUN SERPL-MCNC: 18 MG/DL (ref 6–23)
CALCIUM SERPL-MCNC: 8.3 MG/DL (ref 8.6–10.6)
CARDIAC TROPONIN I PNL SERPL HS: 6 NG/L (ref 0–34)
CHLORIDE SERPL-SCNC: 103 MMOL/L (ref 98–107)
CLARITY, POC: CLEAR
CO2 SERPL-SCNC: 24 MMOL/L (ref 21–32)
COLOR, POC: YELLOW
CREAT SERPL-MCNC: 0.79 MG/DL (ref 0.5–1.05)
EGFRCR SERPLBLD CKD-EPI 2021: >90 ML/MIN/1.73M*2
ERYTHROCYTE [DISTWIDTH] IN BLOOD BY AUTOMATED COUNT: 13.8 % (ref 11.5–14.5)
GLUCOSE SERPL-MCNC: 86 MG/DL (ref 74–99)
GLUCOSE URINE, POC: NEGATIVE
HCT VFR BLD AUTO: 32.6 % (ref 36–46)
HGB BLD-MCNC: 10.3 G/DL (ref 12–16)
KETONES, POC: NEGATIVE
LDH SERPL L TO P-CCNC: 224 U/L (ref 84–246)
LEUKOCYTE EST, POC: NEGATIVE
MCH RBC QN AUTO: 25.7 PG (ref 26–34)
MCHC RBC AUTO-ENTMCNC: 31.6 G/DL (ref 32–36)
MCV RBC AUTO: 81 FL (ref 80–100)
NITRITE, POC: NEGATIVE
NRBC BLD-RTO: 0.4 /100 WBCS (ref 0–0)
PH, POC: 5.5
PLATELET # BLD AUTO: 413 X10*3/UL (ref 150–450)
POC APPEARANCE OF BODY FLUID: CLEAR
POTASSIUM SERPL-SCNC: 4.2 MMOL/L (ref 3.5–5.3)
PROT SERPL-MCNC: 6.5 G/DL (ref 6.4–8.2)
RBC # BLD AUTO: 4.01 X10*6/UL (ref 4–5.2)
SODIUM SERPL-SCNC: 137 MMOL/L (ref 136–145)
SPECIFIC GRAVITY, POC: 1
URINE PROTEIN, POC: NEGATIVE
UROBILINOGEN, POC: 0.2
WBC # BLD AUTO: 11.7 X10*3/UL (ref 4.4–11.3)

## 2024-08-13 PROCEDURE — 2500000004 HC RX 250 GENERAL PHARMACY W/ HCPCS (ALT 636 FOR OP/ED): Performed by: STUDENT IN AN ORGANIZED HEALTH CARE EDUCATION/TRAINING PROGRAM

## 2024-08-13 PROCEDURE — 71046 X-RAY EXAM CHEST 2 VIEWS: CPT | Performed by: STUDENT IN AN ORGANIZED HEALTH CARE EDUCATION/TRAINING PROGRAM

## 2024-08-13 PROCEDURE — 93010 ELECTROCARDIOGRAM REPORT: CPT | Performed by: INTERNAL MEDICINE

## 2024-08-13 PROCEDURE — 83880 ASSAY OF NATRIURETIC PEPTIDE: CPT

## 2024-08-13 PROCEDURE — 2500000002 HC RX 250 W HCPCS SELF ADMINISTERED DRUGS (ALT 637 FOR MEDICARE OP, ALT 636 FOR OP/ED)

## 2024-08-13 PROCEDURE — 71046 X-RAY EXAM CHEST 2 VIEWS: CPT

## 2024-08-13 PROCEDURE — 99199 UNLISTED SPECIAL SVC PX/RPRT: CPT

## 2024-08-13 PROCEDURE — 2500000001 HC RX 250 WO HCPCS SELF ADMINISTERED DRUGS (ALT 637 FOR MEDICARE OP)

## 2024-08-13 PROCEDURE — 2500000004 HC RX 250 GENERAL PHARMACY W/ HCPCS (ALT 636 FOR OP/ED)

## 2024-08-13 PROCEDURE — 83615 LACTATE (LD) (LDH) ENZYME: CPT

## 2024-08-13 PROCEDURE — 2500000002 HC RX 250 W HCPCS SELF ADMINISTERED DRUGS (ALT 637 FOR MEDICARE OP, ALT 636 FOR OP/ED): Performed by: STUDENT IN AN ORGANIZED HEALTH CARE EDUCATION/TRAINING PROGRAM

## 2024-08-13 PROCEDURE — 1120000001 HC OB PRIVATE ROOM DAILY

## 2024-08-13 PROCEDURE — 85027 COMPLETE CBC AUTOMATED: CPT

## 2024-08-13 PROCEDURE — 36415 COLL VENOUS BLD VENIPUNCTURE: CPT

## 2024-08-13 PROCEDURE — 99222 1ST HOSP IP/OBS MODERATE 55: CPT

## 2024-08-13 PROCEDURE — 93005 ELECTROCARDIOGRAM TRACING: CPT

## 2024-08-13 PROCEDURE — 81002 URINALYSIS NONAUTO W/O SCOPE: CPT

## 2024-08-13 PROCEDURE — 84484 ASSAY OF TROPONIN QUANT: CPT

## 2024-08-13 PROCEDURE — 80053 COMPREHEN METABOLIC PANEL: CPT

## 2024-08-13 RX ORDER — TRANEXAMIC ACID 100 MG/ML
1000 INJECTION, SOLUTION INTRAVENOUS ONCE AS NEEDED
Status: DISCONTINUED | OUTPATIENT
Start: 2024-08-13 | End: 2024-08-15 | Stop reason: HOSPADM

## 2024-08-13 RX ORDER — DIPHENHYDRAMINE HYDROCHLORIDE 50 MG/ML
25 INJECTION INTRAMUSCULAR; INTRAVENOUS ONCE
Status: COMPLETED | OUTPATIENT
Start: 2024-08-13 | End: 2024-08-13

## 2024-08-13 RX ORDER — LABETALOL HYDROCHLORIDE 5 MG/ML
20 INJECTION, SOLUTION INTRAVENOUS ONCE AS NEEDED
Status: DISCONTINUED | OUTPATIENT
Start: 2024-08-13 | End: 2024-08-15 | Stop reason: HOSPADM

## 2024-08-13 RX ORDER — ONDANSETRON HYDROCHLORIDE 2 MG/ML
4 INJECTION, SOLUTION INTRAVENOUS EVERY 6 HOURS PRN
Status: DISCONTINUED | OUTPATIENT
Start: 2024-08-13 | End: 2024-08-15 | Stop reason: HOSPADM

## 2024-08-13 RX ORDER — MISOPROSTOL 200 UG/1
800 TABLET ORAL ONCE AS NEEDED
Status: DISCONTINUED | OUTPATIENT
Start: 2024-08-13 | End: 2024-08-15 | Stop reason: HOSPADM

## 2024-08-13 RX ORDER — NIFEDIPINE 10 MG/1
10 CAPSULE ORAL ONCE AS NEEDED
Status: DISCONTINUED | OUTPATIENT
Start: 2024-08-13 | End: 2024-08-15 | Stop reason: HOSPADM

## 2024-08-13 RX ORDER — HYDRALAZINE HYDROCHLORIDE 20 MG/ML
5 INJECTION INTRAMUSCULAR; INTRAVENOUS ONCE AS NEEDED
Status: COMPLETED | OUTPATIENT
Start: 2024-08-13 | End: 2024-08-13

## 2024-08-13 RX ORDER — IBUPROFEN 600 MG/1
600 TABLET ORAL EVERY 6 HOURS PRN
Status: DISCONTINUED | OUTPATIENT
Start: 2024-08-13 | End: 2024-08-15 | Stop reason: HOSPADM

## 2024-08-13 RX ORDER — ACETAMINOPHEN 325 MG/1
975 TABLET ORAL EVERY 6 HOURS PRN
Status: DISCONTINUED | OUTPATIENT
Start: 2024-08-13 | End: 2024-08-14

## 2024-08-13 RX ORDER — NIFEDIPINE 30 MG/1
30 TABLET, FILM COATED, EXTENDED RELEASE ORAL ONCE
Status: COMPLETED | OUTPATIENT
Start: 2024-08-13 | End: 2024-08-13

## 2024-08-13 RX ORDER — CALCIUM GLUCONATE 98 MG/ML
1 INJECTION, SOLUTION INTRAVENOUS ONCE AS NEEDED
Status: DISCONTINUED | OUTPATIENT
Start: 2024-08-13 | End: 2024-08-15 | Stop reason: HOSPADM

## 2024-08-13 RX ORDER — NIFEDIPINE 30 MG/1
30 TABLET, FILM COATED, EXTENDED RELEASE ORAL DAILY
Status: DISCONTINUED | OUTPATIENT
Start: 2024-08-13 | End: 2024-08-13

## 2024-08-13 RX ORDER — METOCLOPRAMIDE HYDROCHLORIDE 5 MG/ML
10 INJECTION INTRAMUSCULAR; INTRAVENOUS EVERY 6 HOURS PRN
Status: DISCONTINUED | OUTPATIENT
Start: 2024-08-13 | End: 2024-08-15 | Stop reason: HOSPADM

## 2024-08-13 RX ORDER — NIFEDIPINE 10 MG/1
10 CAPSULE ORAL ONCE AS NEEDED
Status: DISCONTINUED | OUTPATIENT
Start: 2024-08-13 | End: 2024-08-13

## 2024-08-13 RX ORDER — ONDANSETRON 4 MG/1
4 TABLET, FILM COATED ORAL EVERY 6 HOURS PRN
Status: DISCONTINUED | OUTPATIENT
Start: 2024-08-13 | End: 2024-08-13

## 2024-08-13 RX ORDER — OXYTOCIN 10 [USP'U]/ML
10 INJECTION, SOLUTION INTRAMUSCULAR; INTRAVENOUS ONCE AS NEEDED
Status: DISCONTINUED | OUTPATIENT
Start: 2024-08-13 | End: 2024-08-15 | Stop reason: HOSPADM

## 2024-08-13 RX ORDER — ONDANSETRON HYDROCHLORIDE 2 MG/ML
4 INJECTION, SOLUTION INTRAVENOUS EVERY 6 HOURS PRN
Status: DISCONTINUED | OUTPATIENT
Start: 2024-08-13 | End: 2024-08-13

## 2024-08-13 RX ORDER — LABETALOL HYDROCHLORIDE 5 MG/ML
20 INJECTION, SOLUTION INTRAVENOUS ONCE AS NEEDED
Status: DISCONTINUED | OUTPATIENT
Start: 2024-08-13 | End: 2024-08-13

## 2024-08-13 RX ORDER — METOCLOPRAMIDE 10 MG/1
10 TABLET ORAL EVERY 6 HOURS PRN
Status: DISCONTINUED | OUTPATIENT
Start: 2024-08-13 | End: 2024-08-15 | Stop reason: HOSPADM

## 2024-08-13 RX ORDER — LOPERAMIDE HYDROCHLORIDE 2 MG/1
4 CAPSULE ORAL EVERY 2 HOUR PRN
Status: DISCONTINUED | OUTPATIENT
Start: 2024-08-13 | End: 2024-08-15 | Stop reason: HOSPADM

## 2024-08-13 RX ORDER — MAGNESIUM SULFATE HEPTAHYDRATE 40 MG/ML
2 INJECTION, SOLUTION INTRAVENOUS CONTINUOUS
Status: DISCONTINUED | OUTPATIENT
Start: 2024-08-13 | End: 2024-08-15 | Stop reason: HOSPADM

## 2024-08-13 RX ORDER — METOCLOPRAMIDE HYDROCHLORIDE 5 MG/ML
10 INJECTION INTRAMUSCULAR; INTRAVENOUS ONCE
Status: COMPLETED | OUTPATIENT
Start: 2024-08-13 | End: 2024-08-13

## 2024-08-13 RX ORDER — SIMETHICONE 80 MG
80 TABLET,CHEWABLE ORAL 4 TIMES DAILY PRN
Status: DISCONTINUED | OUTPATIENT
Start: 2024-08-13 | End: 2024-08-15 | Stop reason: HOSPADM

## 2024-08-13 RX ORDER — NIFEDIPINE 60 MG/1
60 TABLET, FILM COATED, EXTENDED RELEASE ORAL DAILY
Status: DISCONTINUED | OUTPATIENT
Start: 2024-08-14 | End: 2024-08-15 | Stop reason: HOSPADM

## 2024-08-13 RX ORDER — HYDRALAZINE HYDROCHLORIDE 20 MG/ML
5 INJECTION INTRAMUSCULAR; INTRAVENOUS ONCE
Status: DISCONTINUED | OUTPATIENT
Start: 2024-08-13 | End: 2024-08-15 | Stop reason: HOSPADM

## 2024-08-13 RX ORDER — FAMOTIDINE 20 MG/1
20 TABLET, FILM COATED ORAL 2 TIMES DAILY
Status: DISCONTINUED | OUTPATIENT
Start: 2024-08-13 | End: 2024-08-15 | Stop reason: HOSPADM

## 2024-08-13 RX ORDER — FERROUS SULFATE 325(65) MG
65 TABLET ORAL DAILY
Status: DISCONTINUED | OUTPATIENT
Start: 2024-08-13 | End: 2024-08-15 | Stop reason: HOSPADM

## 2024-08-13 RX ORDER — CARBOPROST TROMETHAMINE 250 UG/ML
250 INJECTION, SOLUTION INTRAMUSCULAR ONCE AS NEEDED
Status: DISCONTINUED | OUTPATIENT
Start: 2024-08-13 | End: 2024-08-15 | Stop reason: HOSPADM

## 2024-08-13 RX ORDER — DIPHENHYDRAMINE HCL 25 MG
25 CAPSULE ORAL ONCE
Status: COMPLETED | OUTPATIENT
Start: 2024-08-13 | End: 2024-08-13

## 2024-08-13 RX ORDER — BISACODYL 10 MG/1
10 SUPPOSITORY RECTAL DAILY PRN
Status: DISCONTINUED | OUTPATIENT
Start: 2024-08-13 | End: 2024-08-15 | Stop reason: HOSPADM

## 2024-08-13 RX ORDER — POLYETHYLENE GLYCOL 3350 17 G/17G
17 POWDER, FOR SOLUTION ORAL 2 TIMES DAILY PRN
Status: DISCONTINUED | OUTPATIENT
Start: 2024-08-13 | End: 2024-08-15 | Stop reason: HOSPADM

## 2024-08-13 RX ORDER — METOCLOPRAMIDE 10 MG/1
10 TABLET ORAL ONCE
Status: COMPLETED | OUTPATIENT
Start: 2024-08-13 | End: 2024-08-13

## 2024-08-13 RX ORDER — FUROSEMIDE 10 MG/ML
20 INJECTION INTRAMUSCULAR; INTRAVENOUS ONCE
Status: COMPLETED | OUTPATIENT
Start: 2024-08-13 | End: 2024-08-13

## 2024-08-13 RX ORDER — SODIUM CHLORIDE, SODIUM LACTATE, POTASSIUM CHLORIDE, CALCIUM CHLORIDE 600; 310; 30; 20 MG/100ML; MG/100ML; MG/100ML; MG/100ML
75 INJECTION, SOLUTION INTRAVENOUS CONTINUOUS
Status: DISCONTINUED | OUTPATIENT
Start: 2024-08-13 | End: 2024-08-15 | Stop reason: HOSPADM

## 2024-08-13 RX ORDER — METHYLERGONOVINE MALEATE 0.2 MG/ML
0.2 INJECTION INTRAVENOUS ONCE AS NEEDED
Status: DISCONTINUED | OUTPATIENT
Start: 2024-08-13 | End: 2024-08-15 | Stop reason: HOSPADM

## 2024-08-13 RX ORDER — ADHESIVE BANDAGE
10 BANDAGE TOPICAL
Status: DISCONTINUED | OUTPATIENT
Start: 2024-08-13 | End: 2024-08-15 | Stop reason: HOSPADM

## 2024-08-13 RX ORDER — LIDOCAINE HYDROCHLORIDE 10 MG/ML
0.5 INJECTION INFILTRATION; PERINEURAL ONCE AS NEEDED
Status: DISCONTINUED | OUTPATIENT
Start: 2024-08-13 | End: 2024-08-13

## 2024-08-13 RX ORDER — HYDRALAZINE HYDROCHLORIDE 20 MG/ML
INJECTION INTRAMUSCULAR; INTRAVENOUS
Status: COMPLETED
Start: 2024-08-13 | End: 2024-08-13

## 2024-08-13 RX ORDER — ENOXAPARIN SODIUM 100 MG/ML
60 INJECTION SUBCUTANEOUS EVERY 24 HOURS
Status: DISCONTINUED | OUTPATIENT
Start: 2024-08-14 | End: 2024-08-15 | Stop reason: HOSPADM

## 2024-08-13 RX ORDER — ONDANSETRON 4 MG/1
4 TABLET, FILM COATED ORAL EVERY 6 HOURS PRN
Status: DISCONTINUED | OUTPATIENT
Start: 2024-08-13 | End: 2024-08-15 | Stop reason: HOSPADM

## 2024-08-13 SDOH — SOCIAL STABILITY: SOCIAL INSECURITY: HAVE YOU HAD THOUGHTS OF HARMING ANYONE ELSE?: NO

## 2024-08-13 SDOH — HEALTH STABILITY: MENTAL HEALTH: NON-SPECIFIC ACTIVE SUICIDAL THOUGHTS (PAST 1 MONTH): NO

## 2024-08-13 SDOH — ECONOMIC STABILITY: HOUSING INSECURITY: DO YOU FEEL UNSAFE GOING BACK TO THE PLACE WHERE YOU ARE LIVING?: NO

## 2024-08-13 SDOH — SOCIAL STABILITY: SOCIAL INSECURITY: HAS ANYONE EVER THREATENED TO HURT YOUR FAMILY OR YOUR PETS?: NO

## 2024-08-13 SDOH — HEALTH STABILITY: MENTAL HEALTH: SUICIDAL BEHAVIOR (LIFETIME): NO

## 2024-08-13 SDOH — SOCIAL STABILITY: SOCIAL INSECURITY: VERBAL ABUSE: DENIES

## 2024-08-13 SDOH — HEALTH STABILITY: MENTAL HEALTH: HAVE YOU USED ANY PRESCRIPTION DRUGS OTHER THAN PRESCRIBED IN THE PAST 12 MONTHS?: NO

## 2024-08-13 SDOH — SOCIAL STABILITY: SOCIAL INSECURITY: HAVE YOU HAD ANY THOUGHTS OF HARMING ANYONE ELSE?: NO

## 2024-08-13 SDOH — HEALTH STABILITY: MENTAL HEALTH: HAVE YOU USED ANY SUBSTANCES (CANABIS, COCAINE, HEROIN, HALLUCINOGENS, INHALANTS, ETC.) IN THE PAST 12 MONTHS?: NO

## 2024-08-13 SDOH — SOCIAL STABILITY: SOCIAL INSECURITY: DOES ANYONE TRY TO KEEP YOU FROM HAVING/CONTACTING OTHER FRIENDS OR DOING THINGS OUTSIDE YOUR HOME?: NO

## 2024-08-13 SDOH — SOCIAL STABILITY: SOCIAL INSECURITY: ABUSE SCREEN: ADULT

## 2024-08-13 SDOH — HEALTH STABILITY: MENTAL HEALTH: WISH TO BE DEAD (PAST 1 MONTH): NO

## 2024-08-13 SDOH — HEALTH STABILITY: MENTAL HEALTH: WERE YOU ABLE TO COMPLETE ALL THE BEHAVIORAL HEALTH SCREENINGS?: YES

## 2024-08-13 SDOH — SOCIAL STABILITY: SOCIAL INSECURITY: DO YOU FEEL ANYONE HAS EXPLOITED OR TAKEN ADVANTAGE OF YOU FINANCIALLY OR OF YOUR PERSONAL PROPERTY?: NO

## 2024-08-13 SDOH — SOCIAL STABILITY: SOCIAL INSECURITY: ARE THERE ANY APPARENT SIGNS OF INJURIES/BEHAVIORS THAT COULD BE RELATED TO ABUSE/NEGLECT?: NO

## 2024-08-13 SDOH — SOCIAL STABILITY: SOCIAL INSECURITY: PHYSICAL ABUSE: DENIES

## 2024-08-13 SDOH — SOCIAL STABILITY: SOCIAL INSECURITY: ARE YOU OR HAVE YOU BEEN THREATENED OR ABUSED PHYSICALLY, EMOTIONALLY, OR SEXUALLY BY ANYONE?: NO

## 2024-08-13 ASSESSMENT — PAIN DESCRIPTION - DESCRIPTORS
DESCRIPTORS: HEADACHE

## 2024-08-13 ASSESSMENT — LIFESTYLE VARIABLES
AUDIT-C TOTAL SCORE: 0
SKIP TO QUESTIONS 9-10: 1
HOW OFTEN DO YOU HAVE 6 OR MORE DRINKS ON ONE OCCASION: NEVER
AUDIT-C TOTAL SCORE: 0
HOW OFTEN DO YOU HAVE A DRINK CONTAINING ALCOHOL: NEVER
HOW MANY STANDARD DRINKS CONTAINING ALCOHOL DO YOU HAVE ON A TYPICAL DAY: PATIENT DOES NOT DRINK

## 2024-08-13 ASSESSMENT — PATIENT HEALTH QUESTIONNAIRE - PHQ9
1. LITTLE INTEREST OR PLEASURE IN DOING THINGS: NOT AT ALL
2. FEELING DOWN, DEPRESSED OR HOPELESS: NOT AT ALL
SUM OF ALL RESPONSES TO PHQ9 QUESTIONS 1 & 2: 0

## 2024-08-13 ASSESSMENT — PAIN SCALES - GENERAL
PAINLEVEL_OUTOF10: 7
PAINLEVEL_OUTOF10: 10 - WORST POSSIBLE PAIN
PAINLEVEL_OUTOF10: 7

## 2024-08-13 ASSESSMENT — ACTIVITIES OF DAILY LIVING (ADL): LACK_OF_TRANSPORTATION: NO

## 2024-08-13 NOTE — TELEPHONE ENCOUNTER
Called patient to discuss symptoms she's experiencing  Identified by name and   Had it since last night, been taking pain medication  At night when laying down, wheezing as well  Cold sweats as well  Advised patient to go to L&D for evaluation  Patient verbalized understanding, all questions/concerns addressed at this time.  States she will go now    EVELYN RecinosN RN

## 2024-08-13 NOTE — H&P
OB Admission H&P    ASSESSMENT & PLAN: Deena Art is a 38 y.o.  now PPD7 from  here for HA and vision changes, now with elevated BP meeting sPEC criteria.    Diagnosis:     sPEC  - Diagnosed based on sustained severe range BPs requiring IV hydral 5/5  - HA and vision changes. HA still a 7/10 after Tylenol/Reglan/Benadryl. Tylenol was administered prior to HELLP labs returning. Given persistence, will start Mg for neuro sxs  - Will start PO nifed 30  - HELLP labs were notable for . Repeat in 6 hours. HOLD Tylenol  - EKG with sinus bradycardia  - BNP/Trop pending  - Monitor UOP    S/P  on   - Doing well, pain well controlled with PO meds, afebrile, tolerating diet, voiding  - Anti-emetics PRN and bowel regimen ordered  - Continue routine postpartum care.   - DVT ppx: DVT risk score 6. Ambulation, SCDs, lovenox ppx    Dispo: Admit for BP monitoring & IV Mag    Discussed with Dr. Noah Coreas MD, PGY-2    Subjective   Patient states she started having a 10/10 headache 2 days ago. Has been taking Tylenol and Motrin q6h since delivery without relief. Took 2 Excedrin Migraine today which didn't help. Also having blurred vision and floaters for the last day. TRACY has worsened since delivery, to the point that she can't bend her ankles. No CP, SOB, or RUQ pain.    Pregnancy Problems (from 24 to present)       Problem Noted Resolved    Elevated blood pressure complicating pregnancy in third trimester, antepartum ([facility]) 2024 by YANN Floyd-CNP No    Priority:  Medium      Overview Signed 2024  1:11 PM by SARABJIT Floyd     - during epidural  - no dx of HDP at this time  - asymptomatic  - normotensive postpartum            OB History    Para Term  AB Living   6 6 6 0 0 6   SAB IAB Ectopic Multiple Live Births   0 0 0 0 6      # Outcome Date GA Lbr Julio/2nd Weight Sex Type Anes PTL Lv   6 Term 24 38w5d 17:45 / 00:09 3.34 kg F Vag-Spont EPI   JOSE ANGEL      Name: Kevyn Art      Apgar1: 8  Apgar5: 9   5 Term 2019   3.317 kg F Vag-Spont      4 Term    3.374 kg M Vag-Spont   JOSE ANGEL   3 Term    2.863 kg F Vag-Spont   JOSE ANGEL   2 Term    3.402 kg F Vag-Spont   JOSE ANGEL   1 Term    3.317 kg M CS-LTranv   JOSE ANGEL       Past Surgical History:   Procedure Laterality Date     SECTION, CLASSIC  2014     Section       Social History     Tobacco Use    Smoking status: Never    Smokeless tobacco: Never   Substance Use Topics    Alcohol use: Never       No Known Allergies    Medications Prior to Admission   Medication Sig Dispense Refill Last Dose    acetaminophen (Tylenol) 325 mg tablet Take 3 tablets (975 mg) by mouth every 6 hours if needed for mild pain (1 - 3) or moderate pain (4 - 6). 120 tablet 0 2024    famotidine (Pepcid) 20 mg tablet Take 1 tablet (20 mg) by mouth 2 times a day.   2024    ferrous sulfate 324 mg (65 mg elemental iron) EC tablet (delayed release) Take 1 tablet by mouth every other day. Do not crush, chew, or split. 15 tablet 0 2024    ibuprofen 600 mg tablet Take 1 tablet (600 mg) by mouth every 6 hours if needed for mild pain (1 - 3) or moderate pain (4 - 6). 60 tablet 0 2024    polyethylene glycol (Glycolax, Miralax) 17 gram/dose powder Take 17 g (mix 1 capful in 8 of liquid) and drink by mouth 2 times a day as needed (constipation). 238 g 0 2024    prenatal vitamin, iron-folic, 27 mg iron-800 mcg folic acid tablet Take 1 tablet by mouth once daily. 90 tablet 3 2024     Objective     Last Vitals  Temp Pulse Resp BP MAP O2 Sat   36.6 °C (97.9 °F) (!) 47 14 (!) 151/78 109 (!) 95 %       Physical Exam  General: Alert and oriented. Appears uncomfortable.  Neuro: Awake, alert, conversational  CV: Bradycardic. Regular rhythm.  Respiratory: Even and unlabored on RA. CTAB.  Abdominal: soft, nontender, nondistended  Extremities: Full ROM. Moderate symmetric BL lower extremity edema, no erythema or  pain  Psych: appropriate mood and affect    Labs in chart were reviewed.   Results from last 7 days   Lab Units 08/13/24  1727   WBC AUTO x10*3/uL 11.7*   HEMOGLOBIN g/dL 10.3*   HEMATOCRIT % 32.6*   PLATELETS AUTO x10*3/uL 413

## 2024-08-13 NOTE — TELEPHONE ENCOUNTER
Pt states that she is 1 wk postpartum and is experiencing severe headaches. Pt states that she has an epidural and was wondering if the headaches are related to the epidural. Pt is requesting if someone could please give her a call to inform her what she should do.

## 2024-08-14 ENCOUNTER — APPOINTMENT (OUTPATIENT)
Dept: CARDIOLOGY | Facility: HOSPITAL | Age: 39
DRG: 776 | End: 2024-08-14
Payer: COMMERCIAL

## 2024-08-14 LAB
ALBUMIN SERPL BCP-MCNC: 3.5 G/DL (ref 3.4–5)
ALBUMIN SERPL BCP-MCNC: 3.8 G/DL (ref 3.4–5)
ALP SERPL-CCNC: 185 U/L (ref 33–110)
ALP SERPL-CCNC: 206 U/L (ref 33–110)
ALT SERPL W P-5'-P-CCNC: 115 U/L (ref 7–45)
ALT SERPL W P-5'-P-CCNC: 116 U/L (ref 7–45)
ANION GAP SERPL CALC-SCNC: 16 MMOL/L (ref 10–20)
ANION GAP SERPL CALC-SCNC: 20 MMOL/L (ref 10–20)
AORTIC VALVE MEAN GRADIENT: 11 MMHG
AORTIC VALVE PEAK VELOCITY: 2.23 M/S
AST SERPL W P-5'-P-CCNC: 33 U/L (ref 9–39)
AST SERPL W P-5'-P-CCNC: 48 U/L (ref 9–39)
ATRIAL RATE: 46 BPM
AV PEAK GRADIENT: 19.9 MMHG
AVA (PEAK VEL): 1.8 CM2
AVA (VTI): 2.35 CM2
BILIRUB SERPL-MCNC: 0.3 MG/DL (ref 0–1.2)
BILIRUB SERPL-MCNC: 0.4 MG/DL (ref 0–1.2)
BUN SERPL-MCNC: 14 MG/DL (ref 6–23)
BUN SERPL-MCNC: 18 MG/DL (ref 6–23)
CALCIUM SERPL-MCNC: 8.2 MG/DL (ref 8.6–10.6)
CALCIUM SERPL-MCNC: 8.4 MG/DL (ref 8.6–10.6)
CHLORIDE SERPL-SCNC: 100 MMOL/L (ref 98–107)
CHLORIDE SERPL-SCNC: 101 MMOL/L (ref 98–107)
CO2 SERPL-SCNC: 24 MMOL/L (ref 21–32)
CO2 SERPL-SCNC: 26 MMOL/L (ref 21–32)
CREAT SERPL-MCNC: 0.65 MG/DL (ref 0.5–1.05)
CREAT SERPL-MCNC: 0.8 MG/DL (ref 0.5–1.05)
EGFRCR SERPLBLD CKD-EPI 2021: >90 ML/MIN/1.73M*2
EGFRCR SERPLBLD CKD-EPI 2021: >90 ML/MIN/1.73M*2
EJECTION FRACTION APICAL 4 CHAMBER: 71.7
EJECTION FRACTION: 65 %
EJECTION FRACTION: 73 %
ERYTHROCYTE [DISTWIDTH] IN BLOOD BY AUTOMATED COUNT: 14 % (ref 11.5–14.5)
GLUCOSE SERPL-MCNC: 116 MG/DL (ref 74–99)
GLUCOSE SERPL-MCNC: 117 MG/DL (ref 74–99)
HCT VFR BLD AUTO: 35.8 % (ref 36–46)
HGB BLD-MCNC: 11.4 G/DL (ref 12–16)
LDH SERPL L TO P-CCNC: 441 U/L (ref 84–246)
LEFT ATRIUM VOLUME AREA LENGTH INDEX BSA: 21.9 ML/M2
LEFT VENTRICLE INTERNAL DIMENSION DIASTOLE: 4.3 CM (ref 3.5–6)
LEFT VENTRICULAR OUTFLOW TRACT DIAMETER: 1.96 CM
MCH RBC QN AUTO: 25.5 PG (ref 26–34)
MCHC RBC AUTO-ENTMCNC: 31.8 G/DL (ref 32–36)
MCV RBC AUTO: 80 FL (ref 80–100)
MITRAL VALVE E/A RATIO: 1.47
NRBC BLD-RTO: 0.4 /100 WBCS (ref 0–0)
P AXIS: 41 DEGREES
P OFFSET: 199 MS
P ONSET: 152 MS
PLATELET # BLD AUTO: 469 X10*3/UL (ref 150–450)
POTASSIUM SERPL-SCNC: 3.9 MMOL/L (ref 3.5–5.3)
POTASSIUM SERPL-SCNC: 4.9 MMOL/L (ref 3.5–5.3)
PR INTERVAL: 134 MS
PROT SERPL-MCNC: 7 G/DL (ref 6.4–8.2)
PROT SERPL-MCNC: 7.7 G/DL (ref 6.4–8.2)
Q ONSET: 219 MS
QRS COUNT: 7 BEATS
QRS DURATION: 80 MS
QT INTERVAL: 504 MS
QTC CALCULATION(BAZETT): 441 MS
QTC FREDERICIA: 461 MS
R AXIS: 5 DEGREES
RBC # BLD AUTO: 4.47 X10*6/UL (ref 4–5.2)
RIGHT VENTRICLE FREE WALL PEAK S': 20.7 CM/S
RIGHT VENTRICLE PEAK SYSTOLIC PRESSURE: 30 MMHG
RIGHT VENTRICLE PEAK SYSTOLIC PRESSURE: 34 MMHG
SODIUM SERPL-SCNC: 139 MMOL/L (ref 136–145)
SODIUM SERPL-SCNC: 139 MMOL/L (ref 136–145)
T AXIS: 41 DEGREES
T OFFSET: 471 MS
TRICUSPID ANNULAR PLANE SYSTOLIC EXCURSION: 2.8 CM
VENTRICULAR RATE: 46 BPM
WBC # BLD AUTO: 15.6 X10*3/UL (ref 4.4–11.3)

## 2024-08-14 PROCEDURE — 93308 TTE F-UP OR LMTD: CPT | Performed by: INTERNAL MEDICINE

## 2024-08-14 PROCEDURE — 93306 TTE W/DOPPLER COMPLETE: CPT

## 2024-08-14 PROCEDURE — 99199 UNLISTED SPECIAL SVC PX/RPRT: CPT

## 2024-08-14 PROCEDURE — 2500000004 HC RX 250 GENERAL PHARMACY W/ HCPCS (ALT 636 FOR OP/ED)

## 2024-08-14 PROCEDURE — 36415 COLL VENOUS BLD VENIPUNCTURE: CPT

## 2024-08-14 PROCEDURE — 2500000004 HC RX 250 GENERAL PHARMACY W/ HCPCS (ALT 636 FOR OP/ED): Performed by: OBSTETRICS & GYNECOLOGY

## 2024-08-14 PROCEDURE — 36415 COLL VENOUS BLD VENIPUNCTURE: CPT | Performed by: STUDENT IN AN ORGANIZED HEALTH CARE EDUCATION/TRAINING PROGRAM

## 2024-08-14 PROCEDURE — 2500000002 HC RX 250 W HCPCS SELF ADMINISTERED DRUGS (ALT 637 FOR MEDICARE OP, ALT 636 FOR OP/ED): Performed by: STUDENT IN AN ORGANIZED HEALTH CARE EDUCATION/TRAINING PROGRAM

## 2024-08-14 PROCEDURE — 83615 LACTATE (LD) (LDH) ENZYME: CPT

## 2024-08-14 PROCEDURE — 2500000001 HC RX 250 WO HCPCS SELF ADMINISTERED DRUGS (ALT 637 FOR MEDICARE OP)

## 2024-08-14 PROCEDURE — 80053 COMPREHEN METABOLIC PANEL: CPT | Performed by: STUDENT IN AN ORGANIZED HEALTH CARE EDUCATION/TRAINING PROGRAM

## 2024-08-14 PROCEDURE — 93308 TTE F-UP OR LMTD: CPT

## 2024-08-14 PROCEDURE — 80053 COMPREHEN METABOLIC PANEL: CPT

## 2024-08-14 PROCEDURE — 2500000004 HC RX 250 GENERAL PHARMACY W/ HCPCS (ALT 636 FOR OP/ED): Performed by: STUDENT IN AN ORGANIZED HEALTH CARE EDUCATION/TRAINING PROGRAM

## 2024-08-14 PROCEDURE — 2500000001 HC RX 250 WO HCPCS SELF ADMINISTERED DRUGS (ALT 637 FOR MEDICARE OP): Performed by: STUDENT IN AN ORGANIZED HEALTH CARE EDUCATION/TRAINING PROGRAM

## 2024-08-14 PROCEDURE — 85027 COMPLETE CBC AUTOMATED: CPT

## 2024-08-14 PROCEDURE — 93306 TTE W/DOPPLER COMPLETE: CPT | Performed by: INTERNAL MEDICINE

## 2024-08-14 PROCEDURE — 1210000001 HC SEMI-PRIVATE ROOM DAILY

## 2024-08-14 RX ORDER — CYCLOBENZAPRINE HCL 10 MG
10 TABLET ORAL 3 TIMES DAILY PRN
Status: DISCONTINUED | OUTPATIENT
Start: 2024-08-14 | End: 2024-08-15 | Stop reason: HOSPADM

## 2024-08-14 RX ORDER — METOCLOPRAMIDE HYDROCHLORIDE 5 MG/ML
10 INJECTION INTRAMUSCULAR; INTRAVENOUS EVERY 6 HOURS PRN
Status: DISCONTINUED | OUTPATIENT
Start: 2024-08-14 | End: 2024-08-15 | Stop reason: HOSPADM

## 2024-08-14 RX ORDER — PROCHLORPERAZINE 25 MG/1
25 SUPPOSITORY RECTAL EVERY 12 HOURS PRN
Status: DISCONTINUED | OUTPATIENT
Start: 2024-08-14 | End: 2024-08-15 | Stop reason: HOSPADM

## 2024-08-14 RX ORDER — PROCHLORPERAZINE EDISYLATE 5 MG/ML
10 INJECTION INTRAMUSCULAR; INTRAVENOUS EVERY 6 HOURS PRN
Status: DISCONTINUED | OUTPATIENT
Start: 2024-08-14 | End: 2024-08-15 | Stop reason: HOSPADM

## 2024-08-14 RX ORDER — DIPHENHYDRAMINE HYDROCHLORIDE 50 MG/ML
25 INJECTION INTRAMUSCULAR; INTRAVENOUS EVERY 6 HOURS PRN
Status: DISCONTINUED | OUTPATIENT
Start: 2024-08-14 | End: 2024-08-15 | Stop reason: HOSPADM

## 2024-08-14 RX ORDER — PROCHLORPERAZINE MALEATE 10 MG
10 TABLET ORAL EVERY 6 HOURS PRN
Status: DISCONTINUED | OUTPATIENT
Start: 2024-08-14 | End: 2024-08-15 | Stop reason: HOSPADM

## 2024-08-14 RX ORDER — FUROSEMIDE 20 MG/1
20 TABLET ORAL DAILY
Status: DISCONTINUED | OUTPATIENT
Start: 2024-08-14 | End: 2024-08-15 | Stop reason: HOSPADM

## 2024-08-14 ASSESSMENT — PAIN SCALES - GENERAL
PAINLEVEL_OUTOF10: 0 - NO PAIN
PAINLEVEL_OUTOF10: 2
PAINLEVEL_OUTOF10: 0 - NO PAIN
PAINLEVEL_OUTOF10: 0 - NO PAIN
PAINLEVEL_OUTOF10: 3
PAINLEVEL_OUTOF10: 0 - NO PAIN
PAINLEVEL_OUTOF10: 0 - NO PAIN

## 2024-08-14 ASSESSMENT — PAIN DESCRIPTION - DESCRIPTORS
DESCRIPTORS: HEADACHE
DESCRIPTORS: HEADACHE

## 2024-08-14 NOTE — PROGRESS NOTES
POSTPARTUM PROGRESS NOTE  SUBJECTIVE  Patient doing well with no vision changes, no RUQ pain. She has no CP, no SOB. HA overall improved, but no abdominal pain and no vaginal bleeding.       OBJECTIVE  Visit Vitals  /76   Pulse 80   Temp 37 °C (98.6 °F) (Temporal)   Resp 20       O:  Gen: alert and oriented  Pulm: lungs CTA bilaterally   CV: RRR  Abd: no RUQ tenderness  Neuro: DTRs 2+ bilaterally                A&P    sPEC  - Diagnosed based on sustained severe range BPs requiring IV hydral 5/5  - HA and vision changes improving,monitor for complete resolution. Mg until   - Current Regimen: PO nifed 60  - HELLP labs were notable for LFTs 111/38 >115/48 >116/33, slight improvement repeat labs in AM to trend  - EKG with sinus bradycardia on admission, now HR improved at 80-90  - , Trop 6, echo wnl per cardiology fellow, Final Echo read wnl, EF 73%  - CXR with pulmonary congestion sp IV lasix 20, Started 5 day PO lasix course (-)  - Monitor UOP, currently wnl     S/P  on   - Doing well, pain well controlled with PO meds, afebrile, tolerating diet, voiding  - Anti-emetics PRN and bowel regimen ordered  - Continue routine postpartum care.   - DVT ppx: DVT risk score 6. Ambulation, SCDs, lovenox ppx    Dispo: Anticipate DC home 8/15 pending labs wnl    Plan of care discussed with Dr. Boyle--Pt Seen this AM.    Wild Fairbanks MD PGY-4

## 2024-08-14 NOTE — SIGNIFICANT EVENT
Update in Plan of Care  SUBJECTIVE  Patient doing well with no vision changes, no RUQ pain. She has no CP, no SOB. She continues to have a 6/10 HA, but no abdominal pain and no vaginal bleeding.       OBJECTIVE  Visit Vitals  /79   Pulse 93   Temp 36.7 °C (98.1 °F) (Temporal)   Resp 17       O:  Gen: alert and oriented  Pulm: lungs CTA bilaterally   CV: RRR  Abd: no RUQ tenderness  Neuro: DTRs 2+ bilaterally                A&P    sPEC  - Diagnosed based on sustained severe range BPs requiring IV hydral 5/5  - HA and vision changes. HA still a 6/10 on magnesium, will continue to treat, Mg until   - Current Regimen: PO nifed 60  - HELLP labs were notable for LFTs 111/38 >115/48, fu AM  - EKG with sinus bradycardia on admission, now HR improved at 80-90  - , Trop 6, echo wnl per cardiology fellow, fu final read  - CXR with pulmonary congestion sp IV lasix 20, will administer 5 day PO lasix course  - Monitor UOP, wnl     S/P  on   - Doing well, pain well controlled with PO meds, afebrile, tolerating diet, voiding  - Anti-emetics PRN and bowel regimen ordered  - Continue routine postpartum care.   - DVT ppx: DVT risk score 6. Ambulation, SCDs, lovenox ppx    Edd Becker MD

## 2024-08-15 ENCOUNTER — TELEPHONE (OUTPATIENT)
Dept: OBSTETRICS AND GYNECOLOGY | Facility: CLINIC | Age: 39
End: 2024-08-15
Payer: COMMERCIAL

## 2024-08-15 VITALS
RESPIRATION RATE: 18 BRPM | HEIGHT: 61 IN | DIASTOLIC BLOOD PRESSURE: 64 MMHG | WEIGHT: 215 LBS | HEART RATE: 78 BPM | TEMPERATURE: 97.2 F | OXYGEN SATURATION: 96 % | BODY MASS INDEX: 40.59 KG/M2 | SYSTOLIC BLOOD PRESSURE: 101 MMHG

## 2024-08-15 DIAGNOSIS — O21.9 NAUSEA AND VOMITING IN PREGNANCY (HHS-HCC): Primary | ICD-10-CM

## 2024-08-15 DIAGNOSIS — O14.90 PRE-ECLAMPSIA, ANTEPARTUM (HHS-HCC): ICD-10-CM

## 2024-08-15 LAB
ALBUMIN SERPL BCP-MCNC: 3.1 G/DL (ref 3.4–5)
ALP SERPL-CCNC: 174 U/L (ref 33–110)
ALT SERPL W P-5'-P-CCNC: 61 U/L (ref 7–45)
ANION GAP SERPL CALC-SCNC: 14 MMOL/L (ref 10–20)
AST SERPL W P-5'-P-CCNC: 14 U/L (ref 9–39)
BILIRUB SERPL-MCNC: 0.3 MG/DL (ref 0–1.2)
BUN SERPL-MCNC: 13 MG/DL (ref 6–23)
CALCIUM SERPL-MCNC: 7 MG/DL (ref 8.6–10.6)
CHLORIDE SERPL-SCNC: 101 MMOL/L (ref 98–107)
CO2 SERPL-SCNC: 27 MMOL/L (ref 21–32)
CREAT SERPL-MCNC: 1.03 MG/DL (ref 0.5–1.05)
EGFRCR SERPLBLD CKD-EPI 2021: 71 ML/MIN/1.73M*2
ERYTHROCYTE [DISTWIDTH] IN BLOOD BY AUTOMATED COUNT: 14.1 % (ref 11.5–14.5)
GLUCOSE SERPL-MCNC: 107 MG/DL (ref 74–99)
HCT VFR BLD AUTO: 33.8 % (ref 36–46)
HGB BLD-MCNC: 11.1 G/DL (ref 12–16)
MCH RBC QN AUTO: 25.2 PG (ref 26–34)
MCHC RBC AUTO-ENTMCNC: 32.8 G/DL (ref 32–36)
MCV RBC AUTO: 77 FL (ref 80–100)
NRBC BLD-RTO: 0 /100 WBCS (ref 0–0)
PLATELET # BLD AUTO: 526 X10*3/UL (ref 150–450)
POTASSIUM SERPL-SCNC: 4 MMOL/L (ref 3.5–5.3)
PROT SERPL-MCNC: 6.5 G/DL (ref 6.4–8.2)
RBC # BLD AUTO: 4.4 X10*6/UL (ref 4–5.2)
SODIUM SERPL-SCNC: 138 MMOL/L (ref 136–145)
WBC # BLD AUTO: 13.6 X10*3/UL (ref 4.4–11.3)

## 2024-08-15 PROCEDURE — 2500000001 HC RX 250 WO HCPCS SELF ADMINISTERED DRUGS (ALT 637 FOR MEDICARE OP): Performed by: STUDENT IN AN ORGANIZED HEALTH CARE EDUCATION/TRAINING PROGRAM

## 2024-08-15 PROCEDURE — 80053 COMPREHEN METABOLIC PANEL: CPT | Performed by: STUDENT IN AN ORGANIZED HEALTH CARE EDUCATION/TRAINING PROGRAM

## 2024-08-15 PROCEDURE — 2500000002 HC RX 250 W HCPCS SELF ADMINISTERED DRUGS (ALT 637 FOR MEDICARE OP, ALT 636 FOR OP/ED): Performed by: STUDENT IN AN ORGANIZED HEALTH CARE EDUCATION/TRAINING PROGRAM

## 2024-08-15 PROCEDURE — 85027 COMPLETE CBC AUTOMATED: CPT | Performed by: STUDENT IN AN ORGANIZED HEALTH CARE EDUCATION/TRAINING PROGRAM

## 2024-08-15 PROCEDURE — 36415 COLL VENOUS BLD VENIPUNCTURE: CPT | Performed by: STUDENT IN AN ORGANIZED HEALTH CARE EDUCATION/TRAINING PROGRAM

## 2024-08-15 PROCEDURE — 2500000001 HC RX 250 WO HCPCS SELF ADMINISTERED DRUGS (ALT 637 FOR MEDICARE OP)

## 2024-08-15 RX ORDER — METOCLOPRAMIDE 10 MG/1
10 TABLET ORAL EVERY 6 HOURS PRN
Qty: 1 TABLET | Refills: 1 | Status: SHIPPED | OUTPATIENT
Start: 2024-08-15 | End: 2024-08-25

## 2024-08-15 RX ORDER — NIFEDIPINE 60 MG/1
60 TABLET, FILM COATED, EXTENDED RELEASE ORAL
Qty: 30 TABLET | Refills: 1 | Status: SHIPPED | OUTPATIENT
Start: 2024-08-15 | End: 2024-08-19 | Stop reason: SINTOL

## 2024-08-15 RX ORDER — FUROSEMIDE 20 MG/1
20 TABLET ORAL DAILY
Qty: 4 TABLET | Refills: 0 | Status: SHIPPED | OUTPATIENT
Start: 2024-08-15 | End: 2024-08-19

## 2024-08-15 RX ORDER — NIFEDIPINE 60 MG/1
60 TABLET, FILM COATED, EXTENDED RELEASE ORAL
Qty: 30 TABLET | Refills: 1 | Status: SHIPPED | OUTPATIENT
Start: 2024-08-15 | End: 2024-08-19 | Stop reason: WASHOUT

## 2024-08-15 ASSESSMENT — PAIN SCALES - GENERAL: PAINLEVEL_OUTOF10: 0 - NO PAIN

## 2024-08-15 NOTE — DISCHARGE SUMMARY
Discharge Summary    Admission Date: 2024  Discharge Date: 08/15/24    Discharge Diagnosis  Severe pre-eclampsia, postpartum ([facility])    Hospital Course  37 yo  s/p  on  admitted due postpartum sPEC. She received 24 hours of Magnesium and BP medication regimen was up titrated to nifedipine 60mg daily. Her labs were notable for a transaminitis that was downtrending on day of discharge. She did have sinus bradycardia on EKG and elevated BNP, therefore cardiac workup was performed and within normal limits (Echo with EF 73%). Her chest x-ray was notable for pulmonary edema and she received one dose of IV lasix and plan to continue course of lasix for 5 days. HR improved to normal limits during hospital course. She was discharged home on HD #3 in stable condition. One week office BP check was requested.        Pertinent Physical Exam At Time of Discharge  General: no acute distress  HEENT: normocephalic, atraumatic  Heart: warm and well perfused  Lungs: breathing comfortably on room air  Extremities: moving all extremities  Neuro: awake and conversant  Psych: appropriate mood and affect      Last Vitals:  Temp Pulse Resp BP MAP Pulse Ox   36.2 °C (97.2 °F) 78 18 101/64 76 96 %     Discharge Meds     Your medication list        START taking these medications        Instructions Last Dose Given Next Dose Due   NIFEdipine ER 60 mg 24 hr tablet  Commonly known as: Adalat CC      Take 1 tablet (60 mg) by mouth once daily in the morning. Take before meals. Do not crush, chew, or split.              CONTINUE taking these medications        Instructions Last Dose Given Next Dose Due   acetaminophen 325 mg tablet  Commonly known as: Tylenol      Take 3 tablets (975 mg) by mouth every 6 hours if needed for mild pain (1 - 3) or moderate pain (4 - 6).       famotidine 20 mg tablet  Commonly known as: Pepcid           ferrous sulfate 324 mg (65 mg elemental iron) EC tablet (delayed release)      Take 1 tablet by  mouth every other day. Do not crush, chew, or split.       ibuprofen 600 mg tablet      Take 1 tablet (600 mg) by mouth every 6 hours if needed for mild pain (1 - 3) or moderate pain (4 - 6).       polyethylene glycol 17 gram/dose powder  Commonly known as: Glycolax, Miralax      Take 17 g (mix 1 capful in 8 of liquid) and drink by mouth 2 times a day as needed (constipation).       prenatal vitamin (iron-folic) 27 mg iron-800 mcg folic acid tablet      Take 1 tablet by mouth once daily.                 Where to Get Your Medications        These medications were sent to [facility]  [address] [place] [id number]      Hours: 8:30 AM to 5 PM Mon-Fri Phone: [phone]   NIFEdipine ER 60 mg 24 hr tablet          Test Results Pending At Discharge  Pending Labs       No current pending labs.            Outpatient Follow-Up  No future appointments.    Seen and discussed with Dr. Noah Reese MD PGY-3

## 2024-08-15 NOTE — HOSPITAL COURSE
37 yo  s/p  on  admitted due postpartum sPEC. She received 24 hours of Magnesium and BP medication regimen was up titrated to nifedipine 60mg daily. Her labs were notable for a transaminitis that was downtrending on day of discharge. She did have sinus bradycardia on EKG and elevated BNP, therefore cardiac workup was performed and within normal limits (Echo with EF 73%). Her chest x-ray was notable for pulmonary edema and she received one dose of IV lasix and plan to continue course of lasix for 5 days. HR improved to normal limits during hospital course. She was discharged home on HD #3 in stable condition. One week office BP check was requested.

## 2024-08-15 NOTE — TELEPHONE ENCOUNTER
Patient calling regarding discharge from hospital and missing lab work and medications  Identified by name and   Patient states she was supposed to receive Reglan and Lasix and well as liver enzymes panel but these are not in system  Encouraged patient to call L&D to have them enter these orders for her since we do not know exact dosing they were planning on for patient, and if she has issues to give us a call back  Patient verbalized understanding, all questions/concerns addressed at this time.    Chelsie Murphy, EVELYNN RN

## 2024-08-15 NOTE — SIGNIFICANT EVENT
BP cuff and Home Monitoring  Patient meets criteria for home monitoring of blood pressure post discharge.  Reason: current preeclampsia with severe features,. Met with patient to assess for availability of home BP monitor.   Patient does not have access to BP monitor at home.  Pt agreed to order home BP monitor from [employer].   X- Large BP monitor delivered to room.. Patient educated on importance of continuing to monitor BP at home, recording BP on home monitoring log and s/sx of when to call her provider.  Pt verbalized understanding the above information.

## 2024-08-15 NOTE — CARE PLAN
Problem: Postpartum  Goal: Experiences normal postpartum course  Outcome: Adequate for Discharge  Goal: Appropriate maternal -  bonding  Outcome: Adequate for Discharge  Goal: No s/sx infection  Outcome: Adequate for Discharge  Goal: No s/sx of hemorrhage  Outcome: Adequate for Discharge  Goal: Minimal s/sx of HDP and BP<160/110  Outcome: Adequate for Discharge     Problem: Hypertensive Disorder of Pregnancy (HDP)  Goal: Minimal s/sx of HDP and BP<160/110  Outcome: Adequate for Discharge  Goal: Adequate urine output (0.5 ml/kg/hr)  Outcome: Adequate for Discharge     Problem: Pain - Adult  Goal: Verbalizes/displays adequate comfort level or baseline comfort level  Outcome: Adequate for Discharge     Problem: Safety - Adult  Goal: Free from fall injury  Outcome: Adequate for Discharge     Problem: Discharge Planning  Goal: Discharge to home or other facility with appropriate resources  Outcome: Adequate for Discharge     The patient's goals for the shift include To rest    The clinical goals for the shift include VSS, bleeding and swelling minimal, and pain controlled with medications.

## 2024-08-19 ENCOUNTER — LAB (OUTPATIENT)
Dept: LAB | Facility: LAB | Age: 39
End: 2024-08-19
Payer: COMMERCIAL

## 2024-08-19 ENCOUNTER — APPOINTMENT (OUTPATIENT)
Dept: OBSTETRICS AND GYNECOLOGY | Facility: CLINIC | Age: 39
End: 2024-08-19
Payer: COMMERCIAL

## 2024-08-19 ENCOUNTER — TELEPHONE (OUTPATIENT)
Dept: OBSTETRICS AND GYNECOLOGY | Facility: CLINIC | Age: 39
End: 2024-08-19

## 2024-08-19 VITALS
WEIGHT: 217 LBS | DIASTOLIC BLOOD PRESSURE: 80 MMHG | SYSTOLIC BLOOD PRESSURE: 128 MMHG | BODY MASS INDEX: 40.97 KG/M2 | HEIGHT: 61 IN

## 2024-08-19 PROCEDURE — 1036F TOBACCO NON-USER: CPT | Performed by: OBSTETRICS & GYNECOLOGY

## 2024-08-19 PROCEDURE — 3008F BODY MASS INDEX DOCD: CPT | Performed by: OBSTETRICS & GYNECOLOGY

## 2024-08-19 PROCEDURE — 36415 COLL VENOUS BLD VENIPUNCTURE: CPT

## 2024-08-19 PROCEDURE — 3079F DIAST BP 80-89 MM HG: CPT | Performed by: OBSTETRICS & GYNECOLOGY

## 2024-08-19 PROCEDURE — 80053 COMPREHEN METABOLIC PANEL: CPT

## 2024-08-19 PROCEDURE — 3074F SYST BP LT 130 MM HG: CPT | Performed by: OBSTETRICS & GYNECOLOGY

## 2024-08-19 PROCEDURE — 99213 OFFICE O/P EST LOW 20 MIN: CPT | Performed by: OBSTETRICS & GYNECOLOGY

## 2024-08-19 RX ORDER — LABETALOL 200 MG/1
400 TABLET, FILM COATED ORAL 2 TIMES DAILY
Qty: 120 TABLET | Refills: 11 | Status: SHIPPED | OUTPATIENT
Start: 2024-08-19 | End: 2025-08-19

## 2024-08-19 ASSESSMENT — ENCOUNTER SYMPTOMS
NAUSEA: 0
BACK PAIN: 0
ABDOMINAL DISTENTION: 0
CHILLS: 1
DIARRHEA: 0
BLOOD IN STOOL: 0
SLEEP DISTURBANCE: 0
HEMATURIA: 0
ABDOMINAL PAIN: 0
VOMITING: 0
APPETITE CHANGE: 0
DYSURIA: 0
FLANK PAIN: 0
FATIGUE: 1
UNEXPECTED WEIGHT CHANGE: 0
CONSTIPATION: 0
FEVER: 0
FREQUENCY: 0
COLOR CHANGE: 0
SHORTNESS OF BREATH: 0

## 2024-08-19 NOTE — PROGRESS NOTES
Deena Art is a 39 y.o.  here for pp visit / BP check    Pt is s/p  on  admitted due postpartum sPEC. She received 24 hours of Magnesium and BP medication regimen was up titrated to nifedipine 60mg daily. Her labs were notable for a transaminitis that was downtrending on day of discharge. She did have sinus bradycardia on EKG and elevated BNP, therefore cardiac workup was performed and within normal limits (Echo with EF 73%). Her chest x-ray was notable for pulmonary edema and she received one dose of IV lasix and plan to continue course of lasix for 5 days.     Pt reports she feels she is having side effects from the nifedipine. Initially she thought it was the magnesium drip but the symptoms have continued. She has mild headache and flushing all over body. She denies CP, SOB or abdominal pain.   She feels like she is recovering normally from vaginal delivery.   Her BP at home has been normal to mild range.    Her baby is doing well. She is not breastfeeding.      Past medical and surgical history reviewed.     Obstetric History  OB History    Para Term  AB Living   6 6 6     6   SAB IAB Ectopic Multiple Live Births         0 6      # Outcome Date GA Lbr Julio/2nd Weight Sex Type Anes PTL Lv   6 Term 24 38w5d 17:45 / 00:09 3.34 kg F Vag-Spont EPI  JOSE ANGEL   5 Term    3.317 kg F Vag-Spont      4 Term    3.374 kg M Vag-Spont   JOSE ANGEL   3 Term    2.863 kg F Vag-Spont   JOSE ANGEL   2 Term    3.402 kg F Vag-Spont   JOSE ANGEL   1 Term    3.317 kg M CS-LTranv   JOSE ANGEL        Past Medical History  She has a past medical history of Encounter for gynecological examination (general) (routine) without abnormal findings, Encounter for routine postpartum follow-up ([facility]) (2022), Encounter for supervision of normal pregnancy, unspecified, unspecified trimester ([facility]) (2018), Mammary duct ectasia of left breast (2015), Personal history of diseases of the skin and subcutaneous  "tissue (10/28/2014), and Personal history of other specified conditions (2015).    Surgical History  She has a past surgical history that includes  section, classic (2014).     Social History  She reports that she has never smoked. She has never used smokeless tobacco. She reports that she does not drink alcohol and does not use drugs.    Family History  No family history on file.      /80   Ht 1.549 m (5' 1\")   Wt 98.4 kg (217 lb)   LMP 2023   Breastfeeding No   BMI 41.00 kg/m²   Patient's last menstrual period was 2023.    Review of Systems   Constitutional:  Positive for chills and fatigue. Negative for appetite change, fever and unexpected weight change.   Respiratory:  Negative for shortness of breath.    Cardiovascular:  Negative for chest pain.   Gastrointestinal:  Negative for abdominal distention, abdominal pain, blood in stool, constipation, diarrhea, nausea and vomiting.   Endocrine: Negative for cold intolerance and heat intolerance.   Genitourinary:  Negative for dyspareunia, dysuria, flank pain, frequency, genital sores, hematuria, menstrual problem, pelvic pain, urgency, vaginal bleeding, vaginal discharge and vaginal pain.   Musculoskeletal:  Negative for back pain.   Skin:  Positive for rash (flushing). Negative for color change.   Psychiatric/Behavioral:  Negative for sleep disturbance.        Physical Exam  Constitutional:       Appearance: Normal appearance.   Skin:     General: Skin is warm and dry.   Neurological:      General: No focal deficit present.      Mental Status: She is alert.   Psychiatric:         Mood and Affect: Mood normal.         Behavior: Behavior normal.           Assessment and Plan:    Follow up for PP pre-e  BP in office wnl.  Pt currently on nifedipine 60 mg daily. Having likely side effects of flushing and headache. Will transition to labetalol 400 mg daily. Pt instructed to take one dose tonight and one dose tomorrow morning " and not to take nifedpine tomorrow.  Continue to check BP twice daily. Call or return to the hospital with any concerning symptoms.   Will follow up on LFTs to be drawn today.

## 2024-08-19 NOTE — TELEPHONE ENCOUNTER
patient mentioned the hospital would send in an order for a Metabolic panel, she states she went to have the blood work done and there was no order. The patient would like the provider to place this order instead of the hospital.

## 2024-08-20 LAB
ALBUMIN SERPL BCP-MCNC: 4.1 G/DL (ref 3.4–5)
ALP SERPL-CCNC: 154 U/L (ref 33–110)
ALT SERPL W P-5'-P-CCNC: 32 U/L (ref 7–45)
ANION GAP SERPL CALC-SCNC: 15 MMOL/L (ref 10–20)
AST SERPL W P-5'-P-CCNC: 16 U/L (ref 9–39)
BILIRUB SERPL-MCNC: 0.4 MG/DL (ref 0–1.2)
BUN SERPL-MCNC: 18 MG/DL (ref 6–23)
CALCIUM SERPL-MCNC: 9.3 MG/DL (ref 8.6–10.6)
CHLORIDE SERPL-SCNC: 102 MMOL/L (ref 98–107)
CO2 SERPL-SCNC: 27 MMOL/L (ref 21–32)
CREAT SERPL-MCNC: 0.8 MG/DL (ref 0.5–1.05)
EGFRCR SERPLBLD CKD-EPI 2021: >90 ML/MIN/1.73M*2
GLUCOSE SERPL-MCNC: 118 MG/DL (ref 74–99)
POTASSIUM SERPL-SCNC: 4.8 MMOL/L (ref 3.5–5.3)
PROT SERPL-MCNC: 7.6 G/DL (ref 6.4–8.2)
SODIUM SERPL-SCNC: 139 MMOL/L (ref 136–145)

## 2024-08-26 ENCOUNTER — APPOINTMENT (OUTPATIENT)
Dept: OBSTETRICS AND GYNECOLOGY | Facility: CLINIC | Age: 39
End: 2024-08-26
Payer: COMMERCIAL

## 2024-08-26 DIAGNOSIS — R20.0 BILATERAL HAND NUMBNESS: ICD-10-CM

## 2024-08-26 DIAGNOSIS — Z30.011 ENCOUNTER FOR INITIAL PRESCRIPTION OF CONTRACEPTIVE PILLS: ICD-10-CM

## 2024-08-26 DIAGNOSIS — G57.01 NEUROPATHY OF RIGHT SCIATIC NERVE: ICD-10-CM

## 2024-08-26 DIAGNOSIS — G57.02 NEUROPATHY OF LEFT SCIATIC NERVE: Primary | ICD-10-CM

## 2024-08-26 RX ORDER — NORETHINDRONE 0.35 MG/1
1 TABLET ORAL DAILY
Qty: 28 TABLET | Refills: 11 | Status: SHIPPED | OUTPATIENT
Start: 2024-08-26 | End: 2025-08-26

## 2024-08-26 ASSESSMENT — ENCOUNTER SYMPTOMS
VOMITING: 0
CONSTIPATION: 0
SHORTNESS OF BREATH: 0
FEVER: 0
BACK PAIN: 0
ABDOMINAL PAIN: 0
COLOR CHANGE: 0
NAUSEA: 0
UNEXPECTED WEIGHT CHANGE: 0
DIARRHEA: 0
ABDOMINAL DISTENTION: 0
CHILLS: 0
FLANK PAIN: 0
APPETITE CHANGE: 0
HEMATURIA: 0
BLOOD IN STOOL: 0
FATIGUE: 0
SLEEP DISTURBANCE: 0
DYSURIA: 0
FREQUENCY: 0

## 2024-08-26 ASSESSMENT — EDINBURGH POSTNATAL DEPRESSION SCALE (EPDS)
I HAVE FELT SAD OR MISERABLE: NO, NOT AT ALL
I HAVE BLAMED MYSELF UNNECESSARILY WHEN THINGS WENT WRONG: NO, NEVER
I HAVE BEEN ANXIOUS OR WORRIED FOR NO GOOD REASON: HARDLY EVER
I HAVE FELT SCARED OR PANICKY FOR NO GOOD REASON: NO, NOT AT ALL
THE THOUGHT OF HARMING MYSELF HAS OCCURRED TO ME: NEVER
THINGS HAVE BEEN GETTING ON TOP OF ME: NO, I HAVE BEEN COPING AS WELL AS EVER
I HAVE BEEN SO UNHAPPY THAT I HAVE BEEN CRYING: NO, NEVER
I HAVE BEEN ABLE TO LAUGH AND SEE THE FUNNY SIDE OF THINGS: AS MUCH AS I ALWAYS COULD
I HAVE BEEN SO UNHAPPY THAT I HAVE HAD DIFFICULTY SLEEPING: NOT AT ALL
I HAVE LOOKED FORWARD WITH ENJOYMENT TO THINGS: RATHER LESS THAN I USED TO
TOTAL SCORE: 2

## 2024-08-26 NOTE — PROGRESS NOTES
Deena Art is a 39 y.o.  here for BP check/PP follow up.    From a delivery standpoint pt is doing well.   Pt was readmitted for pp pre-e and started on nifedipine.  Pt was having symptoms of headache and flushing from the nifedipine. Last week we switched rx to labetalol 400 mg BID and patient is doing much better. She states the flushing stopped immediately.  She continues to have very mild intermittent headaches. Denies CP or SOB. Denies vision changes.  BP at home mostly 120s - 130s over 70s - 80s    Still having some very light and intermittent bleeding.   Bottle/formula feeding. Baby doing well.        Past medical and surgical history reviewed.     Obstetric History  OB History    Para Term  AB Living   6 6 6     6   SAB IAB Ectopic Multiple Live Births         0 6      # Outcome Date GA Lbr Julio/2nd Weight Sex Type Anes PTL Lv   6 Term 24 38w5d 17:45 / 00:09 3.34 kg F Vag-Spont EPI  JOSE ANGEL   5 Term    3.317 kg F Vag-Spont      4 Term    3.374 kg M Vag-Spont   JOSE ANGEL   3 Term    2.863 kg F Vag-Spont   JOSE ANGEL   2 Term    3.402 kg F Vag-Spont   JOSE ANGEL   1 Term    3.317 kg M CS-LTranv   JOSE ANGEL        Past Medical History  She has a past medical history of Encounter for gynecological examination (general) (routine) without abnormal findings, Encounter for routine postpartum follow-up ([facility]) (2022), Encounter for supervision of normal pregnancy, unspecified, unspecified trimester ([facility]) (2018), Mammary duct ectasia of left breast (2015), Personal history of diseases of the skin and subcutaneous tissue (10/28/2014), and Personal history of other specified conditions (2015).    Surgical History  She has a past surgical history that includes  section, classic (2014).     Social History  She reports that she has never smoked. She has never used smokeless tobacco. She reports that she does not drink alcohol and does not use drugs.    Family  History  No family history on file.      LMP 12/04/2023   Patient's last menstrual period was 12/04/2023.    Review of Systems   Constitutional:  Negative for appetite change, chills, fatigue, fever and unexpected weight change.   Respiratory:  Negative for shortness of breath.    Cardiovascular:  Negative for chest pain.   Gastrointestinal:  Negative for abdominal distention, abdominal pain, blood in stool, constipation, diarrhea, nausea and vomiting.   Endocrine: Negative for cold intolerance and heat intolerance.   Genitourinary:  Negative for dyspareunia, dysuria, flank pain, frequency, genital sores, hematuria, menstrual problem, pelvic pain, urgency, vaginal bleeding, vaginal discharge and vaginal pain.   Musculoskeletal:  Negative for back pain.   Skin:  Negative for color change.   Psychiatric/Behavioral:  Negative for sleep disturbance.        Physical Exam  Constitutional:       Appearance: Normal appearance.   Neurological:      Mental Status: She is alert.           Assessment and Plan:    PP pre-e  Treated and doing well  BP wnl at home  Cont labetalol 400 mg BID  Return 6-8 weeks for full pp visit -- pt would like mirena IUD at that time, discussed placement, removal procedures and possible side effect.s Pt is hoping for amenorrhea. We discussed that while that is common it is not 100%. Pt expressed understanding.       Pt interested in weight los medication .  She will follow up with her PCP to discuss appropriate timing to start this medication. She will also follow up for BP control.

## 2024-09-19 ENCOUNTER — TELEPHONE (OUTPATIENT)
Dept: OBSTETRICS AND GYNECOLOGY | Facility: CLINIC | Age: 39
End: 2024-09-19
Payer: COMMERCIAL

## 2024-09-19 NOTE — TELEPHONE ENCOUNTER
Called patient to discuss symptoms she is experiencing  Identified by name and   Very lightheaded, somewhat dizzy  Patient is currently taking 800 mg daily labetalol for about 4 weeks now  Will discuss with provider and get back to patient.    WERNER Recinos RN

## 2024-09-19 NOTE — TELEPHONE ENCOUNTER
patient called in regrds to feeling dizzy and lightheaded. patient states she took her BP and it was 110/73 with a pulse of 73. States her BP is usually around 130 Systolic. Patient states she just started taking labetalol (Normodyne) 200 mg tablet. Patient seems to believe her home BP reas higher than normal and is concerned her 110 reading is much lower. Patient would lke to know should she discontinue this medication or lower the dose?   What Is The Reason For Today's Visit?: Full Body Skin Examination What Is The Reason For Today's Visit? (Being Monitored For X): concerning skin lesions on an annual basis Additional History: Additionally, patient presents for counseling about sun protection and skin cancer prevention

## 2024-09-19 NOTE — TELEPHONE ENCOUNTER
Called patient back regarding discussion with provider  Informed patient per provider to half dosage to 1 pill 2 times daily and see how she feels over the next couple of days, if BP low and she is still experiencing same symptoms she can discontinue medication  Patient verbalized understanding, all questions/concerns ddressed at this time.    EVELYN RecinosN RN

## 2024-09-25 ENCOUNTER — APPOINTMENT (OUTPATIENT)
Dept: OBSTETRICS AND GYNECOLOGY | Facility: CLINIC | Age: 39
End: 2024-09-25
Payer: COMMERCIAL

## 2024-09-25 VITALS
DIASTOLIC BLOOD PRESSURE: 72 MMHG | SYSTOLIC BLOOD PRESSURE: 113 MMHG | WEIGHT: 220 LBS | BODY MASS INDEX: 41.54 KG/M2 | HEIGHT: 61 IN

## 2024-09-25 DIAGNOSIS — Z30.430 ENCOUNTER FOR INSERTION OF INTRAUTERINE CONTRACEPTIVE DEVICE (IUD): ICD-10-CM

## 2024-09-25 DIAGNOSIS — Z97.5 CONTRACEPTION, DEVICE INTRAUTERINE: Primary | ICD-10-CM

## 2024-09-25 PROCEDURE — 58300 INSERT INTRAUTERINE DEVICE: CPT | Performed by: OBSTETRICS & GYNECOLOGY

## 2024-09-25 PROCEDURE — 0503F POSTPARTUM CARE VISIT: CPT | Performed by: OBSTETRICS & GYNECOLOGY

## 2024-09-25 ASSESSMENT — ENCOUNTER SYMPTOMS
BACK PAIN: 0
BLOOD IN STOOL: 0
FREQUENCY: 0
SLEEP DISTURBANCE: 0
VOMITING: 0
APPETITE CHANGE: 0
DIARRHEA: 0
NAUSEA: 0
FLANK PAIN: 0
UNEXPECTED WEIGHT CHANGE: 0
HEMATURIA: 0
COLOR CHANGE: 0
DYSURIA: 0
FATIGUE: 0
CHILLS: 0
SHORTNESS OF BREATH: 0
ABDOMINAL PAIN: 0
CONSTIPATION: 0
FEVER: 0
ABDOMINAL DISTENTION: 0

## 2024-09-25 ASSESSMENT — EDINBURGH POSTNATAL DEPRESSION SCALE (EPDS)
I HAVE BEEN ANXIOUS OR WORRIED FOR NO GOOD REASON: NO, NOT AT ALL
I HAVE BLAMED MYSELF UNNECESSARILY WHEN THINGS WENT WRONG: NOT VERY OFTEN
I HAVE FELT SCARED OR PANICKY FOR NO GOOD REASON: NO, NOT AT ALL
I HAVE BEEN SO UNHAPPY THAT I HAVE HAD DIFFICULTY SLEEPING: NOT AT ALL
THINGS HAVE BEEN GETTING ON TOP OF ME: NO, MOST OF THE TIME I HAVE COPED QUITE WELL
I HAVE BEEN ABLE TO LAUGH AND SEE THE FUNNY SIDE OF THINGS: AS MUCH AS I ALWAYS COULD
I HAVE BEEN SO UNHAPPY THAT I HAVE BEEN CRYING: NO, NEVER
THE THOUGHT OF HARMING MYSELF HAS OCCURRED TO ME: NEVER
TOTAL SCORE: 2
I HAVE LOOKED FORWARD WITH ENJOYMENT TO THINGS: AS MUCH AS I EVER DID
I HAVE FELT SAD OR MISERABLE: NO, NOT AT ALL

## 2024-09-25 ASSESSMENT — PAIN SCALES - GENERAL: PAINLEVEL: 0-NO PAIN

## 2024-09-25 NOTE — PROGRESS NOTES
"Deena Art is a 39 y.o.  here for PPV and mirena IUD placement.    Delivery complicated by PP pre-e. Pt was in nifedipine but having all over flushing so switched to labetalol and has had to decrease dose.  Bps at home are 100-120/70-80 on 200 mg BID.  Pt feeling some fatigue.   Otherwise patient feeling well.    Menstrual cycles: started menses today   Sexually active: not since delivery      Past medical and surgical history reviewed.     Obstetric History  OB History    Para Term  AB Living   6 6 6     6   SAB IAB Ectopic Multiple Live Births         0 6      # Outcome Date GA Lbr Julio/2nd Weight Sex Type Anes PTL Lv   6 Term 24 38w5d 17:45 / 00:09 3.34 kg F Vag-Spont EPI  JOSE ANGEL   5 Term    3.317 kg F Vag-Spont      4 Term    3.374 kg M Vag-Spont   JOSE ANGEL   3 Term    2.863 kg F Vag-Spont   JOSE ANGEL   2 Term    3.402 kg F Vag-Spont   JOSE ANGEL   1 Term    3.317 kg M CS-LTranv   JOSE ANGEL        Past Medical History  She has a past medical history of Encounter for gynecological examination (general) (routine) without abnormal findings, Encounter for routine postpartum follow-up ([facility]) (2022), Encounter for supervision of normal pregnancy, unspecified, unspecified trimester ([facility]) (2018), Mammary duct ectasia of left breast (2015), Personal history of diseases of the skin and subcutaneous tissue (10/28/2014), and Personal history of other specified conditions (2015).    Surgical History  She has a past surgical history that includes  section, classic (2014).     Social History  She reports that she has never smoked. She has never used smokeless tobacco. She reports that she does not drink alcohol and does not use drugs.    Family History  No family history on file.      /72 (BP Location: Left arm, Patient Position: Sitting)   Ht 1.549 m (5' 1\")   Wt 99.8 kg (220 lb)   LMP 2024 (Exact Date)   Breastfeeding No   BMI 41.57 kg/m² "   Patient's last menstrual period was 09/24/2024 (exact date).    Review of Systems   Constitutional:  Negative for appetite change, chills, fatigue, fever and unexpected weight change.   Respiratory:  Negative for shortness of breath.    Cardiovascular:  Negative for chest pain.   Gastrointestinal:  Negative for abdominal distention, abdominal pain, blood in stool, constipation, diarrhea, nausea and vomiting.   Endocrine: Negative for cold intolerance and heat intolerance.   Genitourinary:  Negative for dyspareunia, dysuria, flank pain, frequency, genital sores, hematuria, menstrual problem, pelvic pain, urgency, vaginal bleeding, vaginal discharge and vaginal pain.   Musculoskeletal:  Negative for back pain.   Skin:  Negative for color change.   Psychiatric/Behavioral:  Negative for sleep disturbance.        Physical Exam  Constitutional:       Appearance: Normal appearance.   Genitourinary:     General: Normal vulva.      Vagina: Normal.      Cervix: Normal.   Neurological:      Mental Status: She is alert.   Psychiatric:         Mood and Affect: Mood normal.         Behavior: Behavior normal.     Patient ID: Deena Art is a 39 y.o. female.    IUD Insertion    Performed by: Tahmina TEMPLE MD  Authorized by: Tahmina TEMPLE MD    Procedure: IUD insertion    Consent obtained by patient, parent, or legal power of  - including discussion of procedure risks and benefits, patient questions answered, and patient education provided: yes    Pregnancy risk: reasonably certain the patient is not pregnant    Date/Time of Insertion:  9/25/2024 11:07 AM  Immediately prior to procedure a time out was called: yes    Speculum placed in vagina: yes    Cervix cleaned and prepped: yes    Tenaculum/Allis/Ring Forceps applied to cervix: yes    Uterus sound depth (cm):  8  Cervix dilated: yes    Cervix dilated with:  Hanks  IUD inserted without complications: yes    OS[facility]: levonorgestrel (Mirena) IUD 20 mcg/day  Strings  trimmed to (cm):  3  Patient tolerated procedure well: yes    Estimated blood loss (mL):  15  Intended removal date: 8 years    Insertion comments: Small tear on anterior lip of cervix from tenaculum placement. Silver nitrate sticks used and god hemostasis noted at end of procedure.        Assessment and Plan:    PP care  -pt doing well  -no evidence of PPD or baby blues    HTN  -stop labetalol as BP are now and pt feeling symptomatic  -cont to monitor BP intermittently     Contraception  -Mirena IUD placed today  -follow up 4-6 weeks   -reviewed possible side effects (bleeding, cramping) and that IUD effective in 7 days - she is currently taking POP and will cont through end of her current pack (6 more days)

## 2024-09-30 NOTE — PROGRESS NOTES
[facility]  Hand and Upper Extremity Service  Initial evaluation / Consultation         Consult requested by Referring Physician: Dr. Real     Chief Complaint: Bilateral hands          39 y.o left hand dominant female who was 7 weeks post-partum who developed bilateral carpal tunnel syndrome during her second semester of pregnancy but she didn't treat it. Following delivery, her symptoms had improved but now as she cares for her , she notices worsening symptoms in her left hand. She reports sleep disturbances and numbness and tingling throughout the day as well. During her pregnancy she also developed preeclampsia but blood pressure has returned to normal. She also developed a lateral femoral cutaneous nerve palsy on the left left which has slowly improved but not resolved following delivery. She isn't breastfeeding and has no history of diabetes or hypothyroid disease.           Please refer to New Patient Intake Form scanned into patient's electronic record for self reported past medical history, past surgical history, medications, allergies, family history, social history and 10 point review of systems    Examination:  Constitutional: Oriented to person, place, and time.  Appears well-developed and well-nourished.  Head: Normocephalic and atraumatic.  Eyes: Pupils are equal, round, and reactive to light.  Cardiovascular: Intact distal pulses.  Pulmonary/Chest/Breast: Effort normal. No respiratory distress.  Neurological: Alert and oriented to person, place, and time.  Skin: Skin is warm and dry.  Psychiatric: normal mood and affect.  Behavior is normal.  Musculoskeletal: Bilateral hands reveal normal appearance. No thenar atrophy. Full finger and thumb flexion without triggering. Positive Benita median nerve compression test bilaterally.        Personal Interpretation of Diagnostic studies: No new images obtained       Impression:  Bilateral carpal tunnel syndrome        Plan: We discussed that this is a very common problem during pregnancy and often improves after delivery, particularly in those who aren't breastfeeding. I've offered to give her braces and injections to help her with her symptoms. She has agreed to proceed with bilateral carpal tunnel injections and was given bilateral cock-up wrist splints. She'll return in the future if she has recurrence of symptoms.       In Office Procedures Performed: Bilateral carpal tunnel injections  Hand / UE Inj/Asp: bilateral carpal tunnel for carpal tunnel syndrome on 10/1/2024 7:37 PM  Indications: pain and therapeutic  Details: 25 G needle, volar approach  Medications (Right): 20 mg triamcinolone acetonide 40 mg/mL; 0.5 mL lidocaine 10 mg/mL (1 %)  Medications (Left): 20 mg triamcinolone acetonide 40 mg/mL; 0.5 mL lidocaine 10 mg/mL (1 %)  Outcome: tolerated well, no immediate complications  Procedure, treatment alternatives, risks and benefits explained, specific risks discussed. Consent was given by the patient. Immediately prior to procedure a time out was called to verify the correct patient, procedure, equipment, support staff and site/side marked as required. Patient was prepped and draped in the usual sterile fashion.       Patient was prescribed a cock up wrist splint for carpal tunnel syndrome. The patient has weakness, instability and/or deformity of their bilateral wrist which requires stabilization from this orthosis to improve their function.      Verbal and written instructions for the use, wear schedule, cleaning and application of this item were given.  Patient was instructed that should the brace result in increased pain, decreased sensation, increased swelling, or an overall worsening of their medical condition, to please contact our office immediately.     Orthotic management and training was provided for skin care, modifications due to healing tissues, edema changes, interruption in skin integrity, and  safety precautions with the orthosis.          Follow up: As needed              Heraclio Fernandez MD  [facility]  Department of Orthopaedic Surgery  Hand and Upper Extremity Reconstruction      Scribe Attestation  By signing my name below, I, Jayjay Abreu   attest that this documentation has been prepared under the direction and in the presence of Dr. Heraclio Fernandez.      Dictation performed with the use of voice recognition software.  Syntax and grammatical errors may exist.

## 2024-10-01 ENCOUNTER — OFFICE VISIT (OUTPATIENT)
Dept: ORTHOPEDIC SURGERY | Facility: HOSPITAL | Age: 39
End: 2024-10-01
Payer: COMMERCIAL

## 2024-10-01 VITALS — HEIGHT: 61 IN | BODY MASS INDEX: 41.54 KG/M2 | WEIGHT: 220 LBS

## 2024-10-01 DIAGNOSIS — G56.03 CARPAL TUNNEL SYNDROME, BILATERAL: Primary | ICD-10-CM

## 2024-10-01 DIAGNOSIS — R20.0 BILATERAL HAND NUMBNESS: ICD-10-CM

## 2024-10-01 PROCEDURE — 99203 OFFICE O/P NEW LOW 30 MIN: CPT | Performed by: ORTHOPAEDIC SURGERY

## 2024-10-01 PROCEDURE — 3008F BODY MASS INDEX DOCD: CPT | Performed by: ORTHOPAEDIC SURGERY

## 2024-10-01 PROCEDURE — 20526 THER INJECTION CARP TUNNEL: CPT | Mod: 50 | Performed by: ORTHOPAEDIC SURGERY

## 2024-10-01 PROCEDURE — 1036F TOBACCO NON-USER: CPT | Performed by: ORTHOPAEDIC SURGERY

## 2024-10-01 PROCEDURE — L3908 WHO COCK-UP NONMOLDE PRE OTS: HCPCS | Performed by: ORTHOPAEDIC SURGERY

## 2024-10-01 PROCEDURE — 2500000004 HC RX 250 GENERAL PHARMACY W/ HCPCS (ALT 636 FOR OP/ED): Performed by: ORTHOPAEDIC SURGERY

## 2024-10-01 PROCEDURE — 99213 OFFICE O/P EST LOW 20 MIN: CPT | Performed by: ORTHOPAEDIC SURGERY

## 2024-10-01 RX ORDER — LIDOCAINE HYDROCHLORIDE 10 MG/ML
0.5 INJECTION, SOLUTION INFILTRATION; PERINEURAL
Status: COMPLETED | OUTPATIENT
Start: 2024-10-01 | End: 2024-10-01

## 2024-10-01 RX ORDER — TRIAMCINOLONE ACETONIDE 40 MG/ML
20 INJECTION, SUSPENSION INTRA-ARTICULAR; INTRAMUSCULAR
Status: COMPLETED | OUTPATIENT
Start: 2024-10-01 | End: 2024-10-01

## 2024-10-01 ASSESSMENT — PAIN SCALES - GENERAL: PAINLEVEL_OUTOF10: 5 - MODERATE PAIN

## 2024-10-01 ASSESSMENT — PAIN - FUNCTIONAL ASSESSMENT: PAIN_FUNCTIONAL_ASSESSMENT: 0-10

## 2024-10-01 ASSESSMENT — PAIN DESCRIPTION - DESCRIPTORS: DESCRIPTORS: ACHING;NUMBNESS;TINGLING;SORE

## 2024-10-01 NOTE — LETTER
2024     Tracy Medeiros MD  [address]  [address]  [facility] [address]    Patient: Deena Art   YOB: 1985   Date of Visit: 10/1/2024       Dear Dr. Tracy Medeiros MD:    Thank you for referring Deena Art to me for evaluation. Below are my notes for this consultation.  If you have questions, please do not hesitate to call me. I look forward to following your patient along with you.       Sincerely,     Heraclio Fernandez MD      CC: Tahmina TEMPLE MD  ______________________________________________________________________________________    [facility]  Hand and Upper Extremity Service  Initial evaluation / Consultation         Consult requested by Referring Physician: Dr. Real     Chief Complaint: Bilateral hands          39 y.o left hand dominant female who was 7 weeks post-partum who developed bilateral carpal tunnel syndrome during her second semester of pregnancy but she didn't treat it. Following delivery, her symptoms had improved but now as she cares for her , she notices worsening symptoms in her left hand. She reports sleep disturbances and numbness and tingling throughout the day as well. During her pregnancy she also developed preeclampsia but blood pressure has returned to normal. She also developed a lateral femoral cutaneous nerve palsy on the left left which has slowly improved but not resolved following delivery. She isn't breastfeeding and has no history of diabetes or hypothyroid disease.           Please refer to New Patient Intake Form scanned into patient's electronic record for self reported past medical history, past surgical history, medications, allergies, family history, social history and 10 point review of systems    Examination:  Constitutional: Oriented to person, place, and time.  Appears well-developed and well-nourished.  Head: Normocephalic and atraumatic.  Eyes: Pupils are equal, round,  and reactive to light.  Cardiovascular: Intact distal pulses.  Pulmonary/Chest/Breast: Effort normal. No respiratory distress.  Neurological: Alert and oriented to person, place, and time.  Skin: Skin is warm and dry.  Psychiatric: normal mood and affect.  Behavior is normal.  Musculoskeletal: Bilateral hands reveal normal appearance. No thenar atrophy. Full finger and thumb flexion without triggering. Positive Benita median nerve compression test bilaterally.        Personal Interpretation of Diagnostic studies: No new images obtained       Impression:  Bilateral carpal tunnel syndrome       Plan: We discussed that this is a very common problem during pregnancy and often improves after delivery, particularly in those who aren't breastfeeding. I've offered to give her braces and injections to help her with her symptoms. She has agreed to proceed with bilateral carpal tunnel injections and was given bilateral cock-up wrist splints. She'll return in the future if she has recurrence of symptoms.       In Office Procedures Performed: Bilateral carpal tunnel injections  Hand / UE Inj/Asp: bilateral carpal tunnel for carpal tunnel syndrome on 10/1/2024 7:37 PM  Indications: pain and therapeutic  Details: 25 G needle, volar approach  Medications (Right): 20 mg triamcinolone acetonide 40 mg/mL; 0.5 mL lidocaine 10 mg/mL (1 %)  Medications (Left): 20 mg triamcinolone acetonide 40 mg/mL; 0.5 mL lidocaine 10 mg/mL (1 %)  Outcome: tolerated well, no immediate complications  Procedure, treatment alternatives, risks and benefits explained, specific risks discussed. Consent was given by the patient. Immediately prior to procedure a time out was called to verify the correct patient, procedure, equipment, support staff and site/side marked as required. Patient was prepped and draped in the usual sterile fashion.       Patient was prescribed a cock up wrist splint for carpal tunnel syndrome. The patient has weakness, instability  and/or deformity of their bilateral wrist which requires stabilization from this orthosis to improve their function.      Verbal and written instructions for the use, wear schedule, cleaning and application of this item were given.  Patient was instructed that should the brace result in increased pain, decreased sensation, increased swelling, or an overall worsening of their medical condition, to please contact our office immediately.     Orthotic management and training was provided for skin care, modifications due to healing tissues, edema changes, interruption in skin integrity, and safety precautions with the orthosis.          Follow up: As needed              Heraclio Fernandez MD  [facility]  Department of Orthopaedic Surgery  Hand and Upper Extremity Reconstruction      Scribe Attestation  By signing my name below, I, Licha Woods , Scribinocencio   attest that this documentation has been prepared under the direction and in the presence of Dr. Heraclio Fernandez.      Dictation performed with the use of voice recognition software.  Syntax and grammatical errors may exist.

## 2024-11-05 ENCOUNTER — ROUTINE PRENATAL (OUTPATIENT)
Dept: OBSTETRICS AND GYNECOLOGY | Facility: CLINIC | Age: 39
End: 2024-11-05
Payer: COMMERCIAL

## 2024-11-05 ENCOUNTER — APPOINTMENT (OUTPATIENT)
Dept: OBSTETRICS AND GYNECOLOGY | Facility: CLINIC | Age: 39
End: 2024-11-05
Payer: COMMERCIAL

## 2024-11-05 DIAGNOSIS — Z30.431 ENCOUNTER FOR ROUTINE CHECKING OF INTRAUTERINE CONTRACEPTIVE DEVICE (IUD): Primary | ICD-10-CM

## 2024-11-05 PROBLEM — O16.3 ELEVATED BLOOD PRESSURE COMPLICATING PREGNANCY IN THIRD TRIMESTER, ANTEPARTUM (HHS-HCC): Status: RESOLVED | Noted: 2024-08-07 | Resolved: 2024-11-05

## 2024-11-05 PROCEDURE — 99213 OFFICE O/P EST LOW 20 MIN: CPT | Performed by: OBSTETRICS & GYNECOLOGY

## 2024-11-05 ASSESSMENT — ENCOUNTER SYMPTOMS
NAUSEA: 0
FATIGUE: 0
COLOR CHANGE: 0
HEMATURIA: 0
BACK PAIN: 0
BLOOD IN STOOL: 0
VOMITING: 0
UNEXPECTED WEIGHT CHANGE: 0
DIARRHEA: 0
SHORTNESS OF BREATH: 0
FLANK PAIN: 0
SLEEP DISTURBANCE: 0
APPETITE CHANGE: 0
CHILLS: 0
FEVER: 0
CONSTIPATION: 0
ABDOMINAL DISTENTION: 0
DYSURIA: 0
ABDOMINAL PAIN: 0
FREQUENCY: 0

## 2024-11-05 NOTE — PROGRESS NOTES
Deena Art is a 39 y.o.  here for follow up after IUD placement / IUD surveillance.    Date of IUD placement: 24  Type of IUD: Mirena     Concerns since placement:    Spotting or bleeding:  Pain:    Obstetric History  OB History    Para Term  AB Living   6 6 6     6   SAB IAB Ectopic Multiple Live Births         0 6      # Outcome Date GA Lbr Julio/2nd Weight Sex Type Anes PTL Lv   6 Term 24 38w5d 17:45 / 00:09 3.34 kg F Vag-Spont EPI  JOSE ANGEL   5 Term    3.317 kg F Vag-Spont      4 Term    3.374 kg M Vag-Spont   JOSE ANGEL   3 Term    2.863 kg F Vag-Spont   JOSE ANGEL   2 Term    3.402 kg F Vag-Spont   JOSE ANGEL   1 Term    3.317 kg M CS-LTranv   JOSE ANGEL        Past Medical History  She has a past medical history of Encounter for gynecological examination (general) (routine) without abnormal findings, Encounter for routine postpartum follow-up (2022), Encounter for supervision of normal pregnancy, unspecified, unspecified trimester (2018), Mammary duct ectasia of left breast (2015), Personal history of diseases of the skin and subcutaneous tissue (10/28/2014), and Personal history of other specified conditions (2015).    Surgical History  She has a past surgical history that includes  section, classic (2014).     Social History  She reports that she has never smoked. She has never used smokeless tobacco. She reports that she does not drink alcohol and does not use drugs.    Family History  No family history on file.      LMP 2023   Patient's last menstrual period was 2023.    Review of Systems    Physical Exam      Assessment and Plan:    Reviewed possible bleeding patterns and side effects over the lifetime of the IUD.  Encouraged pt to wait 6-12 months before making any decisions about removal if not completely happy with IUD today.   Reviewed length of time IUD effective for symptoms and birth control.   Routine IUD surveillance  recommended with annual GYN visits or if any problems.

## 2024-11-05 NOTE — PROGRESS NOTES
Deena Art is a 39 y.o.  here for follow up after IUD placement / IUD surveillance.    Date of IUD placement: 24  Type of IUD: Mirena     Concerns since placement: still having light bleeding    Baby doing well. Bottle feeding.   Pt has been able to wean off her BP medication and is feeling well.     Obstetric History  OB History    Para Term  AB Living   6 6 6     6   SAB IAB Ectopic Multiple Live Births         0 6      # Outcome Date GA Lbr Julio/2nd Weight Sex Type Anes PTL Lv   6 Term 24 38w5d 17:45 / 00:09 3.34 kg F Vag-Spont EPI  JOSE ANGEL   5 Term    3.317 kg F Vag-Spont      4 Term    3.374 kg M Vag-Spont   JOSE ANGEL   3 Term    2.863 kg F Vag-Spont   JOSE ANGEL   2 Term    3.402 kg F Vag-Spont   JOSE ANGEL   1 Term    3.317 kg M CS-LTranv   JOSE ANGEL        Past Medical History  She has a past medical history of Encounter for gynecological examination (general) (routine) without abnormal findings, Encounter for routine postpartum follow-up (2022), Encounter for supervision of normal pregnancy, unspecified, unspecified trimester (2018), Mammary duct ectasia of left breast (2015), Personal history of diseases of the skin and subcutaneous tissue (10/28/2014), and Personal history of other specified conditions (2015).    Surgical History  She has a past surgical history that includes  section, classic (2014).     Social History  She reports that she has never smoked. She has never used smokeless tobacco. She reports that she does not drink alcohol and does not use drugs.    Family History  No family history on file.      LMP  (LMP Unknown)   Breastfeeding No   No LMP recorded (lmp unknown).    Review of Systems   Constitutional:  Negative for appetite change, chills, fatigue, fever and unexpected weight change.   Respiratory:  Negative for shortness of breath.    Cardiovascular:  Negative for chest pain.   Gastrointestinal:  Negative for abdominal  distention, abdominal pain, blood in stool, constipation, diarrhea, nausea and vomiting.   Endocrine: Negative for cold intolerance and heat intolerance.   Genitourinary:  Negative for dyspareunia, dysuria, flank pain, frequency, genital sores, hematuria, menstrual problem, pelvic pain, urgency, vaginal bleeding, vaginal discharge and vaginal pain.   Musculoskeletal:  Negative for back pain.   Skin:  Negative for color change.   Psychiatric/Behavioral:  Negative for sleep disturbance.        Physical Exam  Constitutional:       Appearance: Normal appearance.   Abdominal:      General: Abdomen is flat.      Palpations: Abdomen is soft.      Tenderness: There is no abdominal tenderness.   Genitourinary:     General: Normal vulva.      Vagina: Normal.      Cervix: Normal.      Uterus: Normal.       Adnexa: Right adnexa normal and left adnexa normal.      Comments: +IUD thread seen    Skin:     General: Skin is warm and dry.   Neurological:      General: No focal deficit present.      Mental Status: She is alert.   Psychiatric:         Mood and Affect: Mood normal.         Behavior: Behavior normal.         Thought Content: Thought content normal.         Judgment: Judgment normal.           Assessment and Plan:    Reviewed possible bleeding patterns and side effects over the lifetime of the IUD.  Encouraged pt to wait 6-12 months before making any decisions about removal if not completely happy with IUD today.   Reviewed length of time IUD effective for symptoms and birth control.   Routine IUD surveillance recommended with annual GYN visits or if any problems.

## 2024-11-07 NOTE — PROGRESS NOTES
Physical Therapy    Physical Therapy Evaluation and Treatment      Patient Name: Deena Art  MRN: [id number]  Today's Date: 11/7/2024    Time Entry:                             Assessment:      Patient presents with clinical signs and symptoms consistent with lumbar strain / joint mobility restriction / disc derangement / spinal stenosis / post operative discectomy / fusion  with / withoutLE radicular symptoms in the L dermatomal distribution.  These impairments affect ADLs, work, recreational, exercise, transfer, ambulation, lift/carry, and sleep function that requires skilled PT intervention to resolve and enable patient to return to previous level of function. Factors that may affect progress in PT are chronic pain, obesity, medical co-morbidities,  work task strain, cognitive function, depression, and patient compliance. Patient is a flexion/stabilization,   extension/stabilization,  neutral stabilization program candidate.  Initial treatment of    Plan:       Current Problem:   No diagnosis found.  Problem List Items Addressed This Visit    None     Subjective         Pain:     Home Living:     Prior Level of Function:       Objective   Cognition:     Observation:      Hip joint clearance: PROM  Flexion  R  L ,  IR   R  L   ,   ER  R   L  ,  Extension  R    L    Single leg stance:  Eyes open  R  sec   L  sec    Lumbar AROM in standing:    Flex   %  fingers to  , Extension    %  ,   Sidebending  R  %  L  %  ,  BB with rotation  Peripheralizes       Centralizes    Myotomal Strength:  L3   R /5   L /5,  L4  R  /5  L  /5, L5 R  /5  L  /5,  S1   R  /5  L  /5    Hip Strength:  Abduction   R  /5  L  /5,   Extension  R   /5    L  /5 ,  Flexion R   /5   L  /5    Reflex: knee jerk   R  L  ,  Achilles  R  L     Sensation: Reduced / hypersensitive  in   xx dermatomal distribution  R  L      Accessory joint mobility: PA glide hypomobile   hypermobile   Pain    Lumbopelvic mobility: Side glide  R   L   restricted    cleared    SI joint: Gaenslen's maneuver   anterior innominate  R  L  posterior innominate  R  L   upslip  R  L downslip  R  L    Palpation: tenderness to  spinous process  iliac crest  PSIS R  L   ASIS R  L   pubic bones lateral sacral border  R    L    + trigger points    Gait: antalgic  Wbing  device    Special Tests:   squat strategy    knee dominant    hip hinge      Outcome Measures:  Oswestry     Treatments:      EDUCATION:     extensive education regarding mechanism of injury, relevant functional anatomy, treatment program rational, self management, HEP, and POC    Goals:

## 2024-11-08 ENCOUNTER — APPOINTMENT (OUTPATIENT)
Dept: PHYSICAL THERAPY | Facility: CLINIC | Age: 39
End: 2024-11-08
Payer: COMMERCIAL

## 2024-11-25 DIAGNOSIS — Z97.5 BREAKTHROUGH BLEEDING ASSOCIATED WITH INTRAUTERINE DEVICE (IUD): Primary | ICD-10-CM

## 2024-11-25 DIAGNOSIS — N92.1 BREAKTHROUGH BLEEDING ASSOCIATED WITH INTRAUTERINE DEVICE (IUD): Primary | ICD-10-CM

## 2024-11-25 RX ORDER — LEVONORGESTREL/ETHIN.ESTRADIOL 0.1-0.02MG
1 TABLET ORAL DAILY
Qty: 84 TABLET | Refills: 3 | Status: SHIPPED | OUTPATIENT
Start: 2024-11-25 | End: 2025-11-25

## 2024-12-06 ENCOUNTER — EVALUATION (OUTPATIENT)
Dept: PHYSICAL THERAPY | Facility: CLINIC | Age: 39
End: 2024-12-06
Payer: COMMERCIAL

## 2024-12-06 DIAGNOSIS — M79.605 PAIN IN BOTH LOWER EXTREMITIES: Primary | ICD-10-CM

## 2024-12-06 DIAGNOSIS — G57.02 NEUROPATHY OF LEFT SCIATIC NERVE: ICD-10-CM

## 2024-12-06 DIAGNOSIS — G57.01 NEUROPATHY OF RIGHT SCIATIC NERVE: ICD-10-CM

## 2024-12-06 DIAGNOSIS — M79.604 PAIN IN BOTH LOWER EXTREMITIES: Primary | ICD-10-CM

## 2024-12-06 PROCEDURE — 97110 THERAPEUTIC EXERCISES: CPT | Mod: GP

## 2024-12-06 PROCEDURE — 97161 PT EVAL LOW COMPLEX 20 MIN: CPT | Mod: GP

## 2024-12-06 ASSESSMENT — ENCOUNTER SYMPTOMS
LOSS OF SENSATION IN FEET: 0
OCCASIONAL FEELINGS OF UNSTEADINESS: 0
DEPRESSION: 0

## 2024-12-06 NOTE — PROGRESS NOTES
Physical Therapy Evaluation    Patient Name: Deena Art  MRN: [id number]  Today's Date: 12/6/2024  Time Calculation  Start Time: 1045  Stop Time: 1130  Time Calculation (min): 45 min  PT Evaluation Time Entry  PT Evaluation (Low) Time Entry: 20  PT Therapeutic Procedures Time Entry  Therapeutic Exercise Time Entry: 20        Insurance: MMO, 40 visits  Plan of Care: 12/6/24 to 3/6/25  Visit #1    Referring MD Venus Real  Imaging Performed None    Assessment     Deena Art is a 39 y.o. referred for B thigh numbness and pain. Patient demonstrates mild diastasis greater distally, decreased core strength, decreased L hip strength, decreased L thigh sensation, and pain. At this time, patient is limited with stairs, sleeping, and generally doing her daily activities without pain. Patient will benefit from physical therapy services to address stated impairments and improve functional mobility. With exercises today, patient did note some possible centralization into lower back.    Plan  Treatment/Interventions: Cryotherapy, Education/ Instruction, Dry needling, Electrical stimulation, Hot pack, Manual therapy, Neuromuscular re-education, Self care/ home management, Therapeutic activities, Therapeutic exercises  PT Plan: Skilled PT  PT Frequency: 1 time per week  Duration: 8-10 weeks  Onset Date: 01/01/24  Certification Period Start Date: 12/06/24  Certification Period End Date: 03/06/25  Number of Treatments Authorized: 40 visits  Rehab Potential: Good  Plan of Care Agreement: Patient    Primary diagnosis: Pain in both lower extremities  Current Problem  1. Pain in both lower extremities  Follow Up In Physical Therapy      2. Neuropathy of left sciatic nerve  PT eval and treat    Follow Up In Physical Therapy      3. Neuropathy of right sciatic nerve  PT eval and treat    Follow Up In Physical Therapy          General:  General  Reason for Referral: L > R thigh pain/numbness  Referred By: venus real  Past  Medical History Relevant to Rehab: had mild symptoms during pregnancy with 5th child, but increased sx and no resolution with most recent pregnancy (baby is 4 months)  Preferred Learning Style: verbal, visual, written  Precautions:  Precautions  STEADI Fall Risk Score (The score of 4 or more indicates an increased risk of falling): 0  Precautions Comment: None  Vital Signs:       Subjective:  Chief complaints: Sciatica, worse during pregnancy (baby 4 months)  Onset/Surgery Date: 1/1/2024  Mechanism of Injury: Pregnancy  Previous History: Yes had slight sx during last pregnancy before most recent  Personal Factors that may impact care:  None    Pain:  Current: 0/10 Best: 0/10 Worst: 5-6/10   Location: Thighs, L > R   Type: numbness, shock type pains   Aggravators: not entirely sure   Alleviators: Hasn't tried anything; stopped taking tylenol/motrin due to rise in liver enzymes   Numbness/tingling? Yes, pretty constant    Function:   Work/Recreation: , 6 kids   Prior Level: Full, but did have lingering numbness after last pregnancy   Current limitations: stairs (down more than up), sleeping   Condition: Improving     Home Situation:    Stairs: yes   Lives with family   No concerns about home set up    Any falls in the past year No     Injuries? N/a    Fear of falling? No    Sleep:    Getting to sleep No   Disturbed Yes   Preferred position(s):      Goals for Therapy:    Decrease pain    Objective   Palpation: R GTB tenderness, mild soreness in low back SPs     ROM/Flexibility:    Lumbar  Flexion: 90      Extension: 25      Sidebend R / L: 45 / 45      Rotation R / L: 100% / 100%     Just mild pulling in all directions      Hip R / L       Ext Rot: WNL      Int Rot: WNL     Strength R / L:     Hip Flexion:     5 / 4+, pain in L     Hip Extension:     5 / 5     Hip Abduction:    4+ / 4    Hip Adduction:    5 / 5    Hip ER :      5 / 4+    Hip IR :      5 / 4+    Knee Extension:   5 / 5    Knee Flexion:  "      5 / 5    Ankle DF:             5 / 5    Ankle PF:              5 / 5     Neurological: Numbness ant thigh L > R, also lateral L knee and posterior L thigh     Gait: Increased pelvic drop during R stance     Special Tests:     Slump R / L : - / -     SLR R / L : - / -      Outcome Measure:    ENRIQUE: 5 / 50 = 10 %      Treatment:  Therapeutic Exercise   PPT 3\" x 10   Hooklying TA set 3\" x 10   Hooklying ball squeeze 5\" x 10   Hooklying clam 5\" x 10 (black)   Sidelying clam x 20 L    Goals:  Active       PT Problem       Patient will be independent with home exercise program for home maintenance.        Start:  12/06/24    Expected End:  01/05/25            Pt will report 50% decrease (not daily) in frequency of L thigh electric shock pains to indicate overall improvement in symptoms.        Start:  12/06/24    Expected End:  01/05/25            Pt will increase L hip strength to 5/5 in weakened muscle groups to facilitate improved lumbopelvic support for weightbearing activities.        Start:  12/06/24    Expected End:  02/04/25            Patient will decrease score on Modified ENRIQUE to less than 10 % to indicate decreased pain and increased functional level.         Start:  12/06/24    Expected End:  02/04/25                             "

## 2024-12-20 ENCOUNTER — DOCUMENTATION (OUTPATIENT)
Dept: PHYSICAL THERAPY | Facility: CLINIC | Age: 39
End: 2024-12-20
Payer: COMMERCIAL

## 2024-12-20 ENCOUNTER — APPOINTMENT (OUTPATIENT)
Dept: PHYSICAL THERAPY | Facility: CLINIC | Age: 39
End: 2024-12-20
Payer: COMMERCIAL

## 2024-12-20 NOTE — PROGRESS NOTES
Physical Therapy                 Therapy Communication Note    Patient Name: Deena Art  MRN: [id number]  Department:   Room: Room/bed info not found  Today's Date: 12/20/2024     Discipline: Physical Therapy          Missed Visit Reason:      Missed Time: Cancel    Comment:First

## 2024-12-27 ENCOUNTER — DOCUMENTATION (OUTPATIENT)
Dept: PHYSICAL THERAPY | Facility: CLINIC | Age: 39
End: 2024-12-27
Payer: COMMERCIAL

## 2024-12-27 NOTE — PROGRESS NOTES
Physical Therapy                 Therapy Communication Note    Patient Name: Deena Art  MRN: [id number]  Department:   Room: Room/bed info not found  Today's Date: 12/27/2024     Discipline: Physical Therapy          Missed Visit Reason:      Missed Time: No Show    Comment:first

## 2025-01-03 ENCOUNTER — TREATMENT (OUTPATIENT)
Dept: PHYSICAL THERAPY | Facility: CLINIC | Age: 40
End: 2025-01-03
Payer: COMMERCIAL

## 2025-01-03 DIAGNOSIS — G57.02 NEUROPATHY OF LEFT SCIATIC NERVE: ICD-10-CM

## 2025-01-03 DIAGNOSIS — M79.604 PAIN IN BOTH LOWER EXTREMITIES: ICD-10-CM

## 2025-01-03 DIAGNOSIS — M79.605 PAIN IN BOTH LOWER EXTREMITIES: ICD-10-CM

## 2025-01-03 DIAGNOSIS — G57.01 NEUROPATHY OF RIGHT SCIATIC NERVE: ICD-10-CM

## 2025-01-03 PROCEDURE — 97110 THERAPEUTIC EXERCISES: CPT | Mod: GP

## 2025-01-03 NOTE — PROGRESS NOTES
"Physical Therapy    Physical Therapy Treatment    Patient Name: Deena Art  MRN: [id number]  Today's Date: 1/3/2025    Time Entry:   Time Calculation  Start Time: 1150  Stop Time: 1228  Time Calculation (min): 38 min     PT Therapeutic Procedures Time Entry  Therapeutic Exercise Time Entry: 38                   Assessment:   Reviewed HEP for patient to try to improve on compliance over next couple of weeks. Able to add new pelvic/core stability and stretches and nustep without aggravation of symptoms.     Plan:   Cont core stability    Current Problem  1. Pain in both lower extremities  Follow Up In Physical Therapy      2. Neuropathy of left sciatic nerve  Follow Up In Physical Therapy      3. Neuropathy of right sciatic nerve  Follow Up In Physical Therapy          Subjective    \"Pain/tingling is worse in L thigh.\"  Precautions   None   Pain   0/10 pain, but numbness in L thigh    Objective   R > L HS tightness    Treatments:  Therapeutic Exercise              PPT 5 sec, 2x10              Hooklying TA set 3 sec x 10              Hooklying ball squeeze w/TA 5 sec x 15              Hooklying clam w/TA 5 sec x 15 (black)              Sidelying clam 3 sec x 20 L   STS w/TA from chair +airex x 10   Pball pelvic mobs x 10 ea direction   Seated lumbar flexion stretch 5\" x 10   Seated hamstring stretch 30\" x 2 ea   Nustep x 5 min       Goals:  Active       PT Problem       Patient will be independent with home exercise program for home maintenance.        Start:  12/06/24    Expected End:  01/05/25            Pt will report 50% decrease (not daily) in frequency of L thigh electric shock pains to indicate overall improvement in symptoms.        Start:  12/06/24    Expected End:  01/05/25            Pt will increase L hip strength to 5/5 in weakened muscle groups to facilitate improved lumbopelvic support for weightbearing activities.        Start:  12/06/24    Expected End:  02/04/25            Patient will decrease score " on Modified ENRIQUE to less than 10 % to indicate decreased pain and increased functional level.         Start:  12/06/24    Expected End:  02/04/25

## 2025-01-10 ENCOUNTER — TELEPHONE (OUTPATIENT)
Dept: PRIMARY CARE | Facility: CLINIC | Age: 40
End: 2025-01-10

## 2025-01-10 ENCOUNTER — APPOINTMENT (OUTPATIENT)
Dept: PRIMARY CARE | Facility: CLINIC | Age: 40
End: 2025-01-10
Payer: COMMERCIAL

## 2025-01-10 ENCOUNTER — TREATMENT (OUTPATIENT)
Dept: PHYSICAL THERAPY | Facility: CLINIC | Age: 40
End: 2025-01-10
Payer: COMMERCIAL

## 2025-01-10 VITALS
DIASTOLIC BLOOD PRESSURE: 78 MMHG | HEIGHT: 61 IN | HEART RATE: 81 BPM | WEIGHT: 225 LBS | SYSTOLIC BLOOD PRESSURE: 120 MMHG | BODY MASS INDEX: 42.48 KG/M2 | OXYGEN SATURATION: 98 %

## 2025-01-10 DIAGNOSIS — Z12.31 ENCOUNTER FOR SCREENING MAMMOGRAM FOR MALIGNANT NEOPLASM OF BREAST: ICD-10-CM

## 2025-01-10 DIAGNOSIS — E66.01 MORBID OBESITY WITH BMI OF 40.0-44.9, ADULT (MULTI): ICD-10-CM

## 2025-01-10 DIAGNOSIS — G57.01 NEUROPATHY OF RIGHT SCIATIC NERVE: ICD-10-CM

## 2025-01-10 DIAGNOSIS — E01.0 THYROMEGALY: ICD-10-CM

## 2025-01-10 DIAGNOSIS — G57.02 NEUROPATHY OF LEFT SCIATIC NERVE: ICD-10-CM

## 2025-01-10 DIAGNOSIS — M79.604 PAIN IN BOTH LOWER EXTREMITIES: ICD-10-CM

## 2025-01-10 DIAGNOSIS — M79.605 PAIN IN BOTH LOWER EXTREMITIES: ICD-10-CM

## 2025-01-10 DIAGNOSIS — Z00.00 ANNUAL PHYSICAL EXAM: Primary | ICD-10-CM

## 2025-01-10 PROCEDURE — 97110 THERAPEUTIC EXERCISES: CPT | Mod: GP

## 2025-01-10 PROCEDURE — 99396 PREV VISIT EST AGE 40-64: CPT | Performed by: INTERNAL MEDICINE

## 2025-01-10 PROCEDURE — 1036F TOBACCO NON-USER: CPT | Performed by: INTERNAL MEDICINE

## 2025-01-10 PROCEDURE — 3008F BODY MASS INDEX DOCD: CPT | Performed by: INTERNAL MEDICINE

## 2025-01-10 ASSESSMENT — PATIENT HEALTH QUESTIONNAIRE - PHQ9
1. LITTLE INTEREST OR PLEASURE IN DOING THINGS: NOT AT ALL
SUM OF ALL RESPONSES TO PHQ9 QUESTIONS 1 AND 2: 0
2. FEELING DOWN, DEPRESSED OR HOPELESS: NOT AT ALL

## 2025-01-10 NOTE — PROGRESS NOTES
"Physical Therapy    Physical Therapy Treatment    Patient Name: Deena Art  MRN: [id number]  Today's Date: 1/10/2025    Time Entry:   Time Calculation  Start Time: 1145  Stop Time: 1225  Time Calculation (min): 40 min     PT Therapeutic Procedures Time Entry  Therapeutic Exercise Time Entry: 40                   Assessment:   Pt had some increased L thigh sx after clamshell, improved somewhat with PPT. Not able to progress standing work due to R foot cut that happened recently. Felt fine EOS.     Plan:   Cont core stability; PPT/TA w/march - add to HEP as able    Current Problem  1. Pain in both lower extremities  Follow Up In Physical Therapy      2. Neuropathy of left sciatic nerve  Follow Up In Physical Therapy      3. Neuropathy of right sciatic nerve  Follow Up In Physical Therapy          Subjective    \"I did the ex's 4 times.\"  Precautions   None   Pain   0/10 pain, but numbness in L thigh    Objective   R > L HS tightness    Treatments:  Therapeutic Exercise              PPT 5 sec, 2x10              Hooklying TA set 3 sec x 10              Hooklying ball squeeze w/TA 5 sec x 20              Hooklying clam w/TA 5 sec x 20 (black)              Sidelying clam 3 sec x 20 L   TA w/bridge 3 sec x 10   Pball pelvic mobs x 10- 20 ea direction   STS w/TA from chair + airex x 10   Seated lumbar flexion stretch 5\" x 10   Seated hamstring stretch 20\" x 3 ea   6\" step up w/march x 10 L  Reporting 2-3/10 pain in L thigh   PPT 5 sec x 10 - decreased pain   Seated lumbar flexion 5 sec x 5 - pain about the same as after pelvic tilts   Recumbent bike L 3 x 5 min       Goals:  Active       PT Problem       Patient will be independent with home exercise program for home maintenance.        Start:  12/06/24    Expected End:  01/05/25            Pt will report 50% decrease (not daily) in frequency of L thigh electric shock pains to indicate overall improvement in symptoms.        Start:  12/06/24    Expected End:  01/05/25       "      Pt will increase L hip strength to 5/5 in weakened muscle groups to facilitate improved lumbopelvic support for weightbearing activities.        Start:  12/06/24    Expected End:  02/04/25            Patient will decrease score on Modified ENRIQUE to less than 10 % to indicate decreased pain and increased functional level.         Start:  12/06/24    Expected End:  02/04/25

## 2025-01-10 NOTE — PROGRESS NOTES
"Subjective   Patient ID: Deena Art is a 39 y.o. female who presents for Annual Exam (Patient here for complete physical exam).    HPI   The patient presents for an annual wellness exam.    Review of Systems  Weight gain   Objective   Pulse 81   Ht 1.549 m (5' 1\")   Wt 102 kg (225 lb)   SpO2 98%   Breastfeeding Unknown   BMI 42.51 kg/m²     Physical Exam  NAD. Cooperative.  HEENT: WNL  Neck: thyromegaly, otherwise WNL  Lungs CTA  Heart: RRR  Abdomen: WNL  Musculoskeletal system: WNL  Neurologic exam: WNL    Assessment/Plan   Diagnoses and all orders for this visit:  Annual physical exam  -     Comprehensive metabolic panel; Future  -     CBC; Future  -     Lipid panel; Future  -     TSH; Future  -     Hemoglobin A1C; Future  Encounter for screening mammogram for malignant neoplasm of breast  -     BI mammo bilateral screening tomosynthesis; Future  Morbid obesity with BMI of 40.0-44.9, adult (Multi)  -     semaglutide 0.25 mg or 0.5 mg (2 mg/3 mL) pen injector; Inject 0.25 mg under the skin 1 (one) time per week.         " 01/21/19 0650   Pain Assessment   Pain Assessment 0-10   Pain Score 8   Pain Type Chronic pain;Acute pain   Pain Location Head;Neck   Pain Orientation Posterior   Pain Radiating Towards b/l shoulders   Pain Descriptors Aching;Penetrating;Pressure; Sore   Pain Frequency Constant/continuous   Pain Onset Ongoing   Clinical Progression Not changed   Effect of Pain on Daily Activities limits act tolerance and dynamic reach   Hospital Pain Intervention(s) Repositioned;Rest;Distraction   Diversional Activities (OT activity)   Restrictions/Precautions   Precautions Fall Risk;Pain;Spinal precautions   ROM Restrictions Yes  (cerv spinal precautions)   Braces or Orthoses C/S Collar   QI: Roll Left and Right   Assistance Needed Physical assistance   Assistance Provided by [place] 50%-74%   Roll Left and Right CARE Score 2   QI: Lying to Sitting on Side of Bed   Assistance Needed Physical assistance   Assistance Provided by [place] Total assistance   Comment Pt crooked in bed when OT entered room   Pt req Ax2 to perform supine<>sit with 1 supporting trunk and 2nd supporting b/l LEs   Lying to Sitting on Side of Bed CARE Score 1   QI: Sit to Stand   Assistance Needed Physical assistance   Assistance Provided by [place] 25%-49%   Comment Deondre/modA dependent on fatigue and seat height   Sit to Stand CARE Score 3   QI: Toilet Transfer   Assistance Needed Physical assistance   Assistance Provided by [place] 25%-49%   Comment modA to bedside commode from EOB   Toilet Transfer CARE Score 3   Toilet Transfer   Surface Assessed Standard Commode   Transfer Technique Stand Pivot   Limitations Noted In Endurance;UE Strength;LE Strength  (pain management)   Toilet Transfer (FIM) 3 - Patient completes 50 - 74% of all tasks   Cognition   Overall Cognitive Status Impaired   Arousal/Participation Cooperative   Attention Difficulty attending to directions   Orientation Level Oriented X4   Memory Decreased recall of precautions   Following Commands Follows one step commands with increased time or repetition   Activity Tolerance   Activity Tolerance Patient limited by pain   Medical Staff Made Aware NSG aware   Assessment   Treatment Assessment Pt requested help to toilet during session  Pt angled in bed when OT entered the room and reporting sever pain in neck and head  Pt req Ax2 this session to perform supine<>sit 2* fatigue, pain, and urgency to urinate  PCA attempted to pull on pt's UEs to lift from bed  OT educated pt and PCA on risk of pulling on UEs 2* cervical spinal precautions and pain management  PCA edu to [facility] flat to perform log roll tech and then apply support at shoulders/trunk to inc safety for pt and caregiver  Pt on commode at end of OT session with PCA present to assist with transfers off commode and assist with ADL routine  Pt would benefit from cont therapy focusing on log roll tech in bed to inc indep with bed mobility as well as overall strength for functional transfers to all surfaces  Cont with POC  Prognosis Fair   Problem List Decreased strength;Decreased range of motion;Decreased endurance; Impaired balance;Decreased mobility; Decreased coordination;Decreased safety awareness;Orthopedic restrictions;Pain; Impaired sensation   Barriers to Discharge Inaccessible home environment   Plan   Treatment/Interventions ADL retraining;Functional transfer training; Therapeutic exercise; Endurance training;Bed mobility   Progress Slow progress, decreased activity tolerance   OT Therapy Minutes   OT Time In 0650   OT Time Out 0705   OT Total Time (minutes) 15   OT Mode of treatment - Individual (minutes) 15   OT Mode of treatment - Concurrent (minutes) 0   OT Mode of treatment - Group (minutes) 0   OT Mode of treatment - Co-treat (minutes) 0   OT Mode of Teatment - Total time(minutes) 15 minutes   Therapy Time missed   Time missed?  No

## 2025-01-17 ENCOUNTER — TREATMENT (OUTPATIENT)
Dept: PHYSICAL THERAPY | Facility: CLINIC | Age: 40
End: 2025-01-17
Payer: COMMERCIAL

## 2025-01-17 DIAGNOSIS — M79.605 PAIN IN BOTH LOWER EXTREMITIES: ICD-10-CM

## 2025-01-17 DIAGNOSIS — G57.02 NEUROPATHY OF LEFT SCIATIC NERVE: ICD-10-CM

## 2025-01-17 DIAGNOSIS — G57.01 NEUROPATHY OF RIGHT SCIATIC NERVE: ICD-10-CM

## 2025-01-17 DIAGNOSIS — M79.604 PAIN IN BOTH LOWER EXTREMITIES: ICD-10-CM

## 2025-01-17 PROCEDURE — 97110 THERAPEUTIC EXERCISES: CPT | Mod: GP

## 2025-01-17 NOTE — PROGRESS NOTES
"Physical Therapy    Physical Therapy Treatment    Patient Name: Deena Art  MRN: [id number]  Today's Date: 1/17/2025    Time Entry:   Time Calculation  Start Time: 1115  Stop Time: 1153  Time Calculation (min): 38 min     PT Therapeutic Procedures Time Entry  Therapeutic Exercise Time Entry: 38                   Assessment:   No aggravation of symptoms today, able to add cable column and upright bike without any issues. Pt reported feeling really good at end of session.     Plan:   PPT/TA w/march     Current Problem  1. Pain in both lower extremities  Follow Up In Physical Therapy      2. Neuropathy of left sciatic nerve  Follow Up In Physical Therapy      3. Neuropathy of right sciatic nerve  Follow Up In Physical Therapy          Subjective    \"I don't think there's a pattern with the tingling\"  Precautions   None   Pain   0/10 pain, but numbness in L thigh    Objective   R > L HS tightness    Treatments:  Therapeutic Exercise              PPT 5 sec, 2x10              Hooklying ball squeeze w/TA 5 sec x 20              Hooklying clam w/TA 5 sec x 20 (black)              Sidelying clam 3 sec x 15 L   TA w/bridge 3 sec x 10   STS w/TA from chair + airex 2 x 10   Seated lumbar flexion stretch 5\" x 10   Seated hamstring stretch 30\" x 2 ea   Ccamb 4 plates x 5 retro and forward   Upright bike x 5 min       Goals:  Active       PT Problem       Patient will be independent with home exercise program for home maintenance.        Start:  12/06/24    Expected End:  01/05/25            Pt will report 50% decrease (not daily) in frequency of L thigh electric shock pains to indicate overall improvement in symptoms.        Start:  12/06/24    Expected End:  01/05/25            Pt will increase L hip strength to 5/5 in weakened muscle groups to facilitate improved lumbopelvic support for weightbearing activities.        Start:  12/06/24    Expected End:  02/04/25            Patient will decrease score on Modified ENRIQUE to less " than 10 % to indicate decreased pain and increased functional level.         Start:  12/06/24    Expected End:  02/04/25

## 2025-01-24 ENCOUNTER — APPOINTMENT (OUTPATIENT)
Dept: PHYSICAL THERAPY | Facility: CLINIC | Age: 40
End: 2025-01-24
Payer: COMMERCIAL

## 2025-01-24 ENCOUNTER — DOCUMENTATION (OUTPATIENT)
Dept: PHYSICAL THERAPY | Facility: CLINIC | Age: 40
End: 2025-01-24
Payer: COMMERCIAL

## 2025-01-24 NOTE — PROGRESS NOTES
Physical Therapy                 Therapy Communication Note    Patient Name: Deena Art  MRN: [id number]  Department:   Room: Room/bed info not found  Today's Date: 1/24/2025     Discipline: Physical Therapy          Missed Visit Reason:      Missed Time: Cancel    Comment:

## 2025-01-27 ENCOUNTER — TELEPHONE (OUTPATIENT)
Dept: PRIMARY CARE | Facility: CLINIC | Age: 40
End: 2025-01-27
Payer: COMMERCIAL

## 2025-01-27 DIAGNOSIS — E66.01 MORBID OBESITY WITH BMI OF 40.0-44.9, ADULT (MULTI): Primary | ICD-10-CM

## 2025-01-28 ENCOUNTER — HOSPITAL ENCOUNTER (OUTPATIENT)
Dept: RADIOLOGY | Facility: CLINIC | Age: 40
Discharge: HOME | End: 2025-01-28
Payer: COMMERCIAL

## 2025-01-28 VITALS — HEIGHT: 61 IN | BODY MASS INDEX: 42.46 KG/M2 | WEIGHT: 224.87 LBS

## 2025-01-28 DIAGNOSIS — Z12.31 ENCOUNTER FOR SCREENING MAMMOGRAM FOR MALIGNANT NEOPLASM OF BREAST: ICD-10-CM

## 2025-01-28 DIAGNOSIS — E01.0 THYROMEGALY: ICD-10-CM

## 2025-01-28 PROCEDURE — 77067 SCR MAMMO BI INCL CAD: CPT

## 2025-01-28 PROCEDURE — 77063 BREAST TOMOSYNTHESIS BI: CPT | Performed by: RADIOLOGY

## 2025-01-28 PROCEDURE — 76536 US EXAM OF HEAD AND NECK: CPT | Performed by: RADIOLOGY

## 2025-01-28 PROCEDURE — 76536 US EXAM OF HEAD AND NECK: CPT

## 2025-01-28 PROCEDURE — 77067 SCR MAMMO BI INCL CAD: CPT | Performed by: RADIOLOGY

## 2025-01-31 ENCOUNTER — TREATMENT (OUTPATIENT)
Dept: PHYSICAL THERAPY | Facility: CLINIC | Age: 40
End: 2025-01-31
Payer: COMMERCIAL

## 2025-01-31 DIAGNOSIS — M79.605 PAIN IN BOTH LOWER EXTREMITIES: ICD-10-CM

## 2025-01-31 DIAGNOSIS — M79.604 PAIN IN BOTH LOWER EXTREMITIES: ICD-10-CM

## 2025-01-31 DIAGNOSIS — G57.02 NEUROPATHY OF LEFT SCIATIC NERVE: ICD-10-CM

## 2025-01-31 DIAGNOSIS — G57.01 NEUROPATHY OF RIGHT SCIATIC NERVE: ICD-10-CM

## 2025-01-31 PROCEDURE — 97110 THERAPEUTIC EXERCISES: CPT | Mod: GP

## 2025-01-31 NOTE — PROGRESS NOTES
"Physical Therapy    Physical Therapy Treatment    Patient Name: Deena Art  MRN: [id number]  Today's Date: 1/31/2025    Time Entry:   Time Calculation  Start Time: 1022  Stop Time: 1100  Time Calculation (min): 38 min     PT Therapeutic Procedures Time Entry  Therapeutic Exercise Time Entry: 38                   Assessment:   Fatigued at end of session. Challenged with lateral amb in cable column and TA w/march.     Plan:   Lat amb w/band    Current Problem  1. Pain in both lower extremities  Follow Up In Physical Therapy      2. Neuropathy of left sciatic nerve  Follow Up In Physical Therapy      3. Neuropathy of right sciatic nerve  Follow Up In Physical Therapy          Subjective    \"My kids were all sick and my baby got her shots and so I haven't been sleeping much or doing my exercises\"  Precautions   None   Pain   0/10 pain, but numbness in L thigh    Objective   R > L HS tightness    Treatments:  Therapeutic Exercise              PPT 5 sec, 2x10              Hooklying ball squeeze w/TA 5 sec x 20              Hooklying clam w/TA 5 sec x 20 (black)              Sidelying clam 3 sec 2 x 10 L   TA w/bridge 3 sec x 15   TA w/march x 10 ea    STS w/TA from chair + airex 2 x 10   Seated hamstring stretch 30\" x 2 ea   Seated lumbar flexion stretch 5\" x 10   Ccamb 4 plates x 5 retro and forward   Ccamb 2 plates x 3 ea way   Upright bike x 5 min       Goals:  Active       PT Problem       Patient will be independent with home exercise program for home maintenance.        Start:  12/06/24    Expected End:  01/05/25            Pt will report 50% decrease (not daily) in frequency of L thigh electric shock pains to indicate overall improvement in symptoms.        Start:  12/06/24    Expected End:  01/05/25            Pt will increase L hip strength to 5/5 in weakened muscle groups to facilitate improved lumbopelvic support for weightbearing activities.        Start:  12/06/24    Expected End:  02/04/25            " Patient will decrease score on Modified ENRIQUE to less than 10 % to indicate decreased pain and increased functional level.         Start:  12/06/24    Expected End:  02/04/25

## 2025-02-07 ENCOUNTER — TREATMENT (OUTPATIENT)
Dept: PHYSICAL THERAPY | Facility: CLINIC | Age: 40
End: 2025-02-07
Payer: COMMERCIAL

## 2025-02-07 DIAGNOSIS — G57.02 NEUROPATHY OF LEFT SCIATIC NERVE: ICD-10-CM

## 2025-02-07 DIAGNOSIS — G57.01 NEUROPATHY OF RIGHT SCIATIC NERVE: ICD-10-CM

## 2025-02-07 DIAGNOSIS — M79.605 PAIN IN BOTH LOWER EXTREMITIES: ICD-10-CM

## 2025-02-07 DIAGNOSIS — M79.604 PAIN IN BOTH LOWER EXTREMITIES: ICD-10-CM

## 2025-02-07 PROCEDURE — 97110 THERAPEUTIC EXERCISES: CPT | Mod: GP

## 2025-02-07 NOTE — PROGRESS NOTES
"Physical Therapy    Physical Therapy Treatment    Patient Name: Deena Art  MRN: [id number]  Today's Date: 2/7/2025    Time Entry:   Time Calculation  Start Time: 0948  Stop Time: 1026  Time Calculation (min): 38 min     PT Therapeutic Procedures Time Entry  Therapeutic Exercise Time Entry: 38                   Assessment:   Progressing well overall. Challenged by step up with march.     Plan:   Lat amb w/band    Current Problem  1. Pain in both lower extremities  Follow Up In Physical Therapy      2. Neuropathy of left sciatic nerve  Follow Up In Physical Therapy      3. Neuropathy of right sciatic nerve  Follow Up In Physical Therapy            Subjective    \"Feels better this week!\"  Precautions   None   Pain   0/10 pain    Objective   R > L HS tightness    Treatments:  Therapeutic Exercise              PPT 5 sec, 2x10              Hooklying ball squeeze w/TA 5 sec x 20              Hooklying clam w/TA 5 sec x 20 (black)   TA w/march x 10 ea    TA w/bridge 3 sec x 15              Sidelying clam 3 sec 2 x 10 L   STS w/TA from chair  2 x 10   Seated hamstring stretch 30\" x 2 ea   Seated lumbar flexion stretch 5\" x 10   Ccamb 4 plates x 5 retro and forward   Ccamb 2 plates x 5 ea way   6\" step up w/march + YKB lift x 10 ea    Slant board stretch 10 sec x 10   Upright bike x 6 min       Goals:  Active       PT Problem       Patient will be independent with home exercise program for home maintenance.        Start:  12/06/24    Expected End:  01/05/25            Pt will report 50% decrease (not daily) in frequency of L thigh electric shock pains to indicate overall improvement in symptoms.        Start:  12/06/24    Expected End:  01/05/25            Pt will increase L hip strength to 5/5 in weakened muscle groups to facilitate improved lumbopelvic support for weightbearing activities.        Start:  12/06/24    Expected End:  02/04/25            Patient will decrease score on Modified ENRIQUE to less than 10 % to " indicate decreased pain and increased functional level.         Start:  12/06/24    Expected End:  02/04/25

## 2025-02-21 ENCOUNTER — TREATMENT (OUTPATIENT)
Dept: PHYSICAL THERAPY | Facility: CLINIC | Age: 40
End: 2025-02-21
Payer: COMMERCIAL

## 2025-02-21 DIAGNOSIS — M79.605 PAIN IN BOTH LOWER EXTREMITIES: ICD-10-CM

## 2025-02-21 DIAGNOSIS — G57.02 NEUROPATHY OF LEFT SCIATIC NERVE: ICD-10-CM

## 2025-02-21 DIAGNOSIS — G57.01 NEUROPATHY OF RIGHT SCIATIC NERVE: ICD-10-CM

## 2025-02-21 DIAGNOSIS — N92.1 BREAKTHROUGH BLEEDING ASSOCIATED WITH INTRAUTERINE DEVICE (IUD): Primary | ICD-10-CM

## 2025-02-21 DIAGNOSIS — Z97.5 BREAKTHROUGH BLEEDING ASSOCIATED WITH INTRAUTERINE DEVICE (IUD): Primary | ICD-10-CM

## 2025-02-21 DIAGNOSIS — M79.604 PAIN IN BOTH LOWER EXTREMITIES: ICD-10-CM

## 2025-02-21 PROCEDURE — 97110 THERAPEUTIC EXERCISES: CPT | Mod: GP

## 2025-02-21 RX ORDER — LEVONORGESTREL/ETHIN.ESTRADIOL 0.1-0.02MG
1 TABLET ORAL DAILY
Qty: 84 TABLET | Refills: 3 | Status: SHIPPED | OUTPATIENT
Start: 2025-02-21 | End: 2026-02-21

## 2025-02-21 NOTE — PROGRESS NOTES
"Physical Therapy    Physical Therapy Treatment    Patient Name: Deena Art  MRN: [id number]  Today's Date: 2/21/2025    Time Entry:   Time Calculation  Start Time: 0954  Stop Time: 1035  Time Calculation (min): 41 min     PT Therapeutic Procedures Time Entry  Therapeutic Exercise Time Entry: 38              Non-Billable Time  Non-billable co-treatment time: 3  Visit Count: 7    Assessment:   Slowly progressing reps and adding weightbearing exercise with good tolerance.     Plan:   Lat step up or heel tap    Current Problem  1. Pain in both lower extremities  Follow Up In Physical Therapy      2. Neuropathy of left sciatic nerve  Follow Up In Physical Therapy      3. Neuropathy of right sciatic nerve  Follow Up In Physical Therapy            Subjective    Feel like L is better but getting some R thigh sx  Precautions   None   Pain   0/10 pain    Objective   R > L HS tightness    Treatments:  Therapeutic Exercise              PPT 5 sec, 2x10              Hooklying ball squeeze w/TA 5 sec x 20              Hooklying clam w/TA 5 sec x 20 (black)   TA w/march x 15 ea    TA w/bridge 3 sec x 15              Sidelying clam 3 sec 2 x 10 L   STS w/TA from chair  2 x 10   Seated lumbar flexion stretch 5\" x 10   Seated hamstring stretch 30\" x 2 ea   6\" step up w/march + YKB lift x 10 ea    DL calf raises x 20   Lat amb black x 5 laps    Ccamb 4 plates x 5 retro and forward   Upright bike x 5 min       Goals:  Active       PT Problem       Patient will be independent with home exercise program for home maintenance.        Start:  12/06/24    Expected End:  01/05/25            Pt will report 50% decrease (not daily) in frequency of L thigh electric shock pains to indicate overall improvement in symptoms.        Start:  12/06/24    Expected End:  01/05/25            Pt will increase L hip strength to 5/5 in weakened muscle groups to facilitate improved lumbopelvic support for weightbearing activities.        Start:  12/06/24    " Expected End:  02/04/25            Patient will decrease score on Modified ENRIQUE to less than 10 % to indicate decreased pain and increased functional level.         Start:  12/06/24    Expected End:  02/04/25

## 2025-02-25 DIAGNOSIS — E66.01 MORBID OBESITY WITH BMI OF 40.0-44.9, ADULT (MULTI): Primary | ICD-10-CM

## 2025-02-28 ENCOUNTER — TREATMENT (OUTPATIENT)
Dept: PHYSICAL THERAPY | Facility: CLINIC | Age: 40
End: 2025-02-28
Payer: COMMERCIAL

## 2025-02-28 DIAGNOSIS — G57.01 NEUROPATHY OF RIGHT SCIATIC NERVE: ICD-10-CM

## 2025-02-28 DIAGNOSIS — G57.02 NEUROPATHY OF LEFT SCIATIC NERVE: ICD-10-CM

## 2025-02-28 DIAGNOSIS — M79.604 PAIN IN BOTH LOWER EXTREMITIES: ICD-10-CM

## 2025-02-28 DIAGNOSIS — M79.605 PAIN IN BOTH LOWER EXTREMITIES: ICD-10-CM

## 2025-02-28 PROCEDURE — 97110 THERAPEUTIC EXERCISES: CPT | Mod: GP

## 2025-02-28 NOTE — PROGRESS NOTES
"Physical Therapy    Physical Therapy Treatment    Patient Name: Deena Art  MRN: [id number]  Today's Date: 2/28/2025    Time Entry:   Time Calculation  Start Time: 1036  Stop Time: 1116  Time Calculation (min): 40 min     PT Therapeutic Procedures Time Entry  Therapeutic Exercise Time Entry: 40                 Visit Count: 8    Assessment:   Good tolerance to increased work today, with more lat hip fatigue noted with heel tap and lat amb.     Plan:   Increase reps for step up + march, SLS or kickstand stance    Current Problem  1. Pain in both lower extremities  Follow Up In Physical Therapy      2. Neuropathy of left sciatic nerve  Follow Up In Physical Therapy      3. Neuropathy of right sciatic nerve  Follow Up In Physical Therapy            Subjective    Some electric shock type pains in L thigh this week, but better last few days.   Precautions   None   Pain   0/10 pain    Objective   R > L HS tightness    Treatments:  Therapeutic Exercise              PPT 5 sec, 2x10              Hooklying ball squeeze w/TA 5 sec x 20              Hooklying clam w/TA 5 sec x 20 (black)   TA w/bridge 3 sec x 20   TA w/march x 15 ea               Sidelying clam 3 sec 2 x 10 L   STS w/TA from chair  2 x 10   Seated lumbar flexion stretch 5\" x 10   Seated hamstring stretch 30\" x 2 ea   Ccamb 4.5 plates x 5 retro and forward   6\" step up w/march + YKB lift x 10 ea    Lat amb black x 4 laps    4\" heel tap x 10 ea    DL calf raises x 20   Upright bike L3 x 6 min       Goals:  Active       PT Problem       Patient will be independent with home exercise program for home maintenance.        Start:  12/06/24    Expected End:  01/05/25            Pt will report 50% decrease (not daily) in frequency of L thigh electric shock pains to indicate overall improvement in symptoms.        Start:  12/06/24    Expected End:  01/05/25            Pt will increase L hip strength to 5/5 in weakened muscle groups to facilitate improved lumbopelvic " support for weightbearing activities.        Start:  12/06/24    Expected End:  02/04/25            Patient will decrease score on Modified ENRIQUE to less than 10 % to indicate decreased pain and increased functional level.         Start:  12/06/24    Expected End:  02/04/25

## 2025-03-07 ENCOUNTER — TREATMENT (OUTPATIENT)
Dept: PHYSICAL THERAPY | Facility: CLINIC | Age: 40
End: 2025-03-07
Payer: COMMERCIAL

## 2025-03-07 DIAGNOSIS — M79.604 PAIN IN BOTH LOWER EXTREMITIES: ICD-10-CM

## 2025-03-07 DIAGNOSIS — G57.01 NEUROPATHY OF RIGHT SCIATIC NERVE: ICD-10-CM

## 2025-03-07 DIAGNOSIS — G57.02 NEUROPATHY OF LEFT SCIATIC NERVE: ICD-10-CM

## 2025-03-07 DIAGNOSIS — M79.605 PAIN IN BOTH LOWER EXTREMITIES: ICD-10-CM

## 2025-03-07 PROCEDURE — 97110 THERAPEUTIC EXERCISES: CPT | Mod: GP

## 2025-03-07 NOTE — PROGRESS NOTES
"Physical Therapy    Physical Therapy Treatment    Patient Name: Deena Art  MRN: [id number]  Today's Date: 3/7/2025    Time Entry:   Time Calculation  Start Time: 1032  Stop Time: 1114  Time Calculation (min): 42 min     PT Therapeutic Procedures Time Entry  Therapeutic Exercise Time Entry: 42                 Visit Count: 9    Assessment:   Better tolerance to lat amb today - added this to HEP. Viper ccamb work was easy so will increase weight next visit. Lat hip fatigue noted with SLS as well.     Plan:   Recheck, increase weight for ccamb    Current Problem  1. Pain in both lower extremities  Follow Up In Physical Therapy      2. Neuropathy of left sciatic nerve  Follow Up In Physical Therapy      3. Neuropathy of right sciatic nerve  Follow Up In Physical Therapy            Subjective    Doing okay this week.   Precautions   None   Pain   0/10 pain    Objective   R > L HS tightness    Treatments:  Therapeutic Exercise              PPT 5 sec, 2x10   TA w/march x 15 ea    TA w/bridge 3 sec x 20              Hooklying ball squeeze w/TA 5 sec x 20              Hooklying clam w/TA 5 sec x 20 (black)              Sidelying clam 5 sec 2 x 10 L   STS w/TA from chair  2 x 10   Seated lumbar flexion stretch 5\" x 10   Seated hamstring stretch 30\" x 2 ea   Ccamb 4.5 plates x 5 retro and forward   Lat amb black x 5 laps    6\" step up w/march + YKB lift x 15 ea    DL calf raises x 20   4\" heel tap x 10 ea    SLS 10 sec x 5 ea, single FTT   Upright bike L3 x 6 min       Goals:  Active       PT Problem       Patient will be independent with home exercise program for home maintenance.        Start:  12/06/24    Expected End:  01/05/25            Pt will report 50% decrease (not daily) in frequency of L thigh electric shock pains to indicate overall improvement in symptoms.        Start:  12/06/24    Expected End:  01/05/25            Pt will increase L hip strength to 5/5 in weakened muscle groups to facilitate improved " lumbopelvic support for weightbearing activities.        Start:  12/06/24    Expected End:  02/04/25            Patient will decrease score on Modified ENRIQUE to less than 10 % to indicate decreased pain and increased functional level.         Start:  12/06/24    Expected End:  02/04/25

## 2025-03-19 DIAGNOSIS — E66.01 MORBID OBESITY WITH BMI OF 40.0-44.9, ADULT (MULTI): Primary | ICD-10-CM

## 2025-03-21 ENCOUNTER — APPOINTMENT (OUTPATIENT)
Dept: PHYSICAL THERAPY | Facility: CLINIC | Age: 40
End: 2025-03-21
Payer: COMMERCIAL

## 2025-03-28 ENCOUNTER — TREATMENT (OUTPATIENT)
Dept: PHYSICAL THERAPY | Facility: CLINIC | Age: 40
End: 2025-03-28
Payer: COMMERCIAL

## 2025-03-28 DIAGNOSIS — M79.604 PAIN IN BOTH LOWER EXTREMITIES: ICD-10-CM

## 2025-03-28 DIAGNOSIS — G57.01 NEUROPATHY OF RIGHT SCIATIC NERVE: ICD-10-CM

## 2025-03-28 DIAGNOSIS — M79.605 PAIN IN BOTH LOWER EXTREMITIES: ICD-10-CM

## 2025-03-28 DIAGNOSIS — G57.02 NEUROPATHY OF LEFT SCIATIC NERVE: ICD-10-CM

## 2025-03-28 PROCEDURE — 97110 THERAPEUTIC EXERCISES: CPT | Mod: GP

## 2025-03-28 NOTE — PROGRESS NOTES
"Physical Therapy    Physical Therapy Treatment    Patient Name: Deena Art  MRN: [id number]  Today's Date: 3/28/2025    Time Entry:   Time Calculation  Start Time: 1031  Stop Time: 1120  Time Calculation (min): 49 min     PT Therapeutic Procedures Time Entry  Therapeutic Exercise Time Entry: 45              Non-Billable Time  Non-billable co-treatment time: 4  Visit Count: 10    Assessment:   Did better with lat amb when lighter band is around ankles. Struggled with heel tap today, but able to progress squats. Pt overall showing good strength improvements, but continues to have functional glute weakness and would benefit from more physical therapy to address her remaining impairments.    Plan:   Try reverse slider lunge instead of heel tap    Current Problem  1. Pain in both lower extremities  Follow Up In Physical Therapy      2. Neuropathy of left sciatic nerve  Follow Up In Physical Therapy      3. Neuropathy of right sciatic nerve  Follow Up In Physical Therapy            Subjective    \"Had the flu last week. I still have the numbness, but its better than it was\"  Precautions   None   Pain   0/10 pain    Objective   Palpation: mild soreness in low back SPs                 ROM/Flexibility:                          Lumbar            Flexion: 90                                                  Extension: 25                                                  Sidebend R / L: 45 / 45                                                  Rotation R / L: 100% / 100%                             Hip R / L                                                            Ext Rot: WNL                                                  Int Rot: WNL                 Strength R / L:                           Hip Flexion:          5 / 5                          Hip Extension:     5 / 5                           Hip Abduction:    5 / 5                          Hip Adduction:    5 / 5                          Hip ER :                5 / 5        " "                  Hip IR :                 5 / 5                          Knee Extension:   5 / 5                          Knee Flexion:       5 / 5                          Ankle DF:             5 / 5                          Ankle PF:              5 / 5                 Neurological: Numbness ant thigh L > R, but smaller area than before                 Gait: Normal appearing gait                 Outcome Measure:                          ENRIQUE: 10 / 50 = 20 %    Treatments:  Therapeutic Exercise              PPT 5 sec, 2x10   TA w/march x 15 ea    TA w/bridge 3 sec x 20              Hooklying ball squeeze w/TA 5 sec x 20              Hooklying clam w/TA 5 sec x 20 (black)   Squat tap to elevated table  2 x 10 (23 inch)   Seated lumbar flexion stretch 5\" x 10   Seated hamstring stretch 30\" x 2 ea   Ccamb 5 plates x 5 retro and forward   Lat amb x 3 laps (yellow at ankles)   7\" step up w/march + YKB lift x 15 ea    DL calf raises x 20   4\" heel tap x 10 L - struggled on R with form so held   SLS 10 sec x 5 ea, single FTT   Upright bike L3 x 10 min       Goals:  Active       PT Problem       Patient will be independent with home exercise program for home maintenance.        Start:  12/06/24    Expected End:  01/05/25            Pt will report 50% decrease (not daily) in frequency of L thigh electric shock pains to indicate overall improvement in symptoms.        Start:  12/06/24    Expected End:  01/05/25            Pt will increase L hip strength to 5/5 in weakened muscle groups to facilitate improved lumbopelvic support for weightbearing activities.        Start:  12/06/24    Expected End:  02/04/25            Patient will decrease score on Modified ENRIQUE to less than 10 % to indicate decreased pain and increased functional level.         Start:  12/06/24    Expected End:  02/04/25              "

## 2025-04-04 ENCOUNTER — TREATMENT (OUTPATIENT)
Dept: PHYSICAL THERAPY | Facility: CLINIC | Age: 40
End: 2025-04-04
Payer: COMMERCIAL

## 2025-04-04 DIAGNOSIS — M79.604 PAIN IN BOTH LOWER EXTREMITIES: ICD-10-CM

## 2025-04-04 DIAGNOSIS — M79.605 PAIN IN BOTH LOWER EXTREMITIES: ICD-10-CM

## 2025-04-04 DIAGNOSIS — G57.02 NEUROPATHY OF LEFT SCIATIC NERVE: ICD-10-CM

## 2025-04-04 DIAGNOSIS — G57.01 NEUROPATHY OF RIGHT SCIATIC NERVE: ICD-10-CM

## 2025-04-04 PROCEDURE — 97110 THERAPEUTIC EXERCISES: CPT | Mod: GP

## 2025-04-04 NOTE — PROGRESS NOTES
"Physical Therapy    Physical Therapy Treatment    Patient Name: Deena Art  MRN: [id number]  Today's Date: 4/4/2025    Time Entry:   Time Calculation  Start Time: 1034  Stop Time: 1115  Time Calculation (min): 41 min     PT Therapeutic Procedures Time Entry  Therapeutic Exercise Time Entry: 41                 Visit Count: 11    Assessment:   Some SIJ sx's after squat taps to elevated table. Did well with rest of treatment, and felt MET was helpful but still a little sore end of session so did not bike usual amount.     Plan:   Assess SIJ sx's    Current Problem  1. Pain in both lower extremities  Follow Up In Physical Therapy      2. Neuropathy of left sciatic nerve  Follow Up In Physical Therapy      3. Neuropathy of right sciatic nerve  Follow Up In Physical Therapy            Subjective    \"Doing okay, I was muscle sore after the last time for a few days\"  Precautions   None   Pain   0/10 pain    Objective   L SIJ tenderness    Treatments:  Therapeutic Exercise              PPT 5 sec, 2x10   TA w/march x 15 ea    TA w/bridge 3 sec x 20              Hooklying ball squeeze w/TA 5 sec x 20              Hooklying clam w/TA 5 sec x 20 (black)   Squat tap to elevated table  2 x 10 (23 inch)   Seated lumbar flexion stretch 5\" x 10   Seated hamstring stretch 30\" x 2 ea   Ccamb 5 plates x 5 retro and forward   Lat amb x 3 laps (yellow at ankles)   DL calf raises x 20   7\" step up w/march + YKB lift x 15 ea    SLS 10 sec x 5 ea, single FTT   Reverse slider lunge x 10 ea    Child's pose 10 sec x 3    L hip ext MET 5 sec x 10   Upright bike L3 x 5 min - stopped early       Goals:  Active       PT Problem       Patient will be independent with home exercise program for home maintenance.        Start:  12/06/24    Expected End:  01/05/25            Pt will report 50% decrease (not daily) in frequency of L thigh electric shock pains to indicate overall improvement in symptoms.        Start:  12/06/24    Expected End:  01/05/25 "            Pt will increase L hip strength to 5/5 in weakened muscle groups to facilitate improved lumbopelvic support for weightbearing activities.        Start:  12/06/24    Expected End:  02/04/25            Patient will decrease score on Modified ENRIQUE to less than 10 % to indicate decreased pain and increased functional level.         Start:  12/06/24    Expected End:  02/04/25

## 2025-04-10 DIAGNOSIS — E66.01 MORBID OBESITY WITH BMI OF 40.0-44.9, ADULT (MULTI): ICD-10-CM

## 2025-04-11 ENCOUNTER — APPOINTMENT (OUTPATIENT)
Dept: PHYSICAL THERAPY | Facility: CLINIC | Age: 40
End: 2025-04-11
Payer: COMMERCIAL

## 2025-05-02 ENCOUNTER — APPOINTMENT (OUTPATIENT)
Dept: PHYSICAL THERAPY | Facility: CLINIC | Age: 40
End: 2025-05-02
Payer: COMMERCIAL

## 2025-05-02 DIAGNOSIS — M79.604 PAIN IN BOTH LOWER EXTREMITIES: ICD-10-CM

## 2025-05-02 DIAGNOSIS — M79.605 PAIN IN BOTH LOWER EXTREMITIES: ICD-10-CM

## 2025-05-02 DIAGNOSIS — G57.01 NEUROPATHY OF RIGHT SCIATIC NERVE: ICD-10-CM

## 2025-05-02 DIAGNOSIS — G57.02 NEUROPATHY OF LEFT SCIATIC NERVE: ICD-10-CM

## 2025-05-02 PROCEDURE — 97110 THERAPEUTIC EXERCISES: CPT | Mod: GP

## 2025-05-02 NOTE — PROGRESS NOTES
"Physical Therapy    Physical Therapy Treatment    Patient Name: Deena Art  MRN: [id number]  Today's Date: 5/2/2025    Time Entry:   Time Calculation  Start Time: 1035  Stop Time: 1115  Time Calculation (min): 40 min     PT Therapeutic Procedures Time Entry  Therapeutic Exercise Time Entry: 40                 Visit Count: 12    Assessment:   Better response to mini squat compared to squat tap to table. No increased pain or sx, just fatigued at end of session.    Plan:   Increase reps of reverse slider lunge    Current Problem  1. Pain in both lower extremities  Follow Up In Physical Therapy      2. Neuropathy of left sciatic nerve  Follow Up In Physical Therapy      3. Neuropathy of right sciatic nerve  Follow Up In Physical Therapy            Subjective    \"Been try to walk more, 30 min 2x when taking daughter to from [facility] and that caused some lower back pain and shocks in L thigh\"  Precautions   None   Pain   0/10 pain    Objective   Good squat form at rail    Treatments:  Therapeutic Exercise              PPT 5 sec, 2x10   TA w/march x 20 ea    TA w/bridge w/3# lift 3 sec x 10              Hooklying ball squeeze w/TA 5 sec x 20              Hooklying clam w/TA 5 sec x 20 (black)   Clamshell L x 20   Mini squat at rail 3 x 10   DL calf raises x 20   7\" step up w/march + YKB lift x 15 ea    Lat amb x 3 laps (yellow at ankles)   Reverse slider lunge x 10 ea    SLS 10 sec x 5 ea, single FTT   Ccamb 6 plates x 5 retro and forward   Seated lumbar flexion stretch 5\" x 10   Seated hamstring stretch 30\" x 2 ea       Goals:  Active       PT Problem       Patient will be independent with home exercise program for home maintenance.        Start:  12/06/24    Expected End:  01/05/25            Pt will report 50% decrease (not daily) in frequency of L thigh electric shock pains to indicate overall improvement in symptoms.        Start:  12/06/24    Expected End:  01/05/25            Pt will increase L hip strength to " 5/5 in weakened muscle groups to facilitate improved lumbopelvic support for weightbearing activities.        Start:  12/06/24    Expected End:  02/04/25            Patient will decrease score on Modified ENRIQUE to less than 10 % to indicate decreased pain and increased functional level.         Start:  12/06/24    Expected End:  02/04/25

## 2025-05-09 ENCOUNTER — APPOINTMENT (OUTPATIENT)
Dept: PHYSICAL THERAPY | Facility: CLINIC | Age: 40
End: 2025-05-09
Payer: COMMERCIAL

## 2025-05-16 ENCOUNTER — TREATMENT (OUTPATIENT)
Dept: PHYSICAL THERAPY | Facility: CLINIC | Age: 40
End: 2025-05-16
Payer: COMMERCIAL

## 2025-05-16 DIAGNOSIS — M79.605 PAIN IN BOTH LOWER EXTREMITIES: ICD-10-CM

## 2025-05-16 DIAGNOSIS — G57.01 NEUROPATHY OF RIGHT SCIATIC NERVE: ICD-10-CM

## 2025-05-16 DIAGNOSIS — M79.604 PAIN IN BOTH LOWER EXTREMITIES: ICD-10-CM

## 2025-05-16 DIAGNOSIS — G57.02 NEUROPATHY OF LEFT SCIATIC NERVE: ICD-10-CM

## 2025-05-16 DIAGNOSIS — E66.01 MORBID OBESITY WITH BMI OF 40.0-44.9, ADULT (MULTI): ICD-10-CM

## 2025-05-16 PROCEDURE — 97110 THERAPEUTIC EXERCISES: CPT | Mod: GP

## 2025-05-16 NOTE — PROGRESS NOTES
"Physical Therapy    Physical Therapy Treatment    Patient Name: Deena Art  MRN: [id number]  Today's Date: 5/16/2025    Time Entry:   Time Calculation  Start Time: 1030  Stop Time: 1108  Time Calculation (min): 38 min     PT Therapeutic Procedures Time Entry  Therapeutic Exercise Time Entry: 38                 Visit Count: 13    Assessment:   Some irritation with mini squat today, decreased reps. Improved after seated stretches and did fine just fatigued with step ups and slider lunges.     Plan:   Increase reps of reverse slider lunge    Current Problem  1. Pain in both lower extremities  Follow Up In Physical Therapy      2. Neuropathy of left sciatic nerve  Follow Up In Physical Therapy      3. Neuropathy of right sciatic nerve  Follow Up In Physical Therapy              Subjective    \"Just been awake since 2AM so feeling tired today\"  Precautions   None   Pain   0/10 pain    Objective   Good squat form at rail    Treatments:  Therapeutic Exercise              PPT 5 sec, 2x10   TA w/march x 20 ea    TA w/bridge w/3# lift 3 sec x 10              Hooklying ball squeeze w/TA 5 sec x 20              Hooklying clam w/TA 5 sec x 20 (black)   Clamshell L x 20   Mini squat at rail 2 x 10   DL calf raises x 20   Seated lumbar flexion stretch 5\" x 10   Seated hamstring stretch 30\" x 2 ea   7\" step up w/march + RKB lift x 10 ea    Reverse slider lunge x 10 ea    Lat amb x 3 laps (yellow at ankles)   SLS 10 sec x 5 ea, single FTT   Ccamb 6 plates x 5 retro and forward       Goals:  Active       PT Problem       Patient will be independent with home exercise program for home maintenance.        Start:  12/06/24    Expected End:  01/05/25            Pt will report 50% decrease (not daily) in frequency of L thigh electric shock pains to indicate overall improvement in symptoms.        Start:  12/06/24    Expected End:  01/05/25            Pt will increase L hip strength to 5/5 in weakened muscle groups to facilitate improved " lumbopelvic support for weightbearing activities.        Start:  12/06/24    Expected End:  02/04/25            Patient will decrease score on Modified ENRIQUE to less than 10 % to indicate decreased pain and increased functional level.         Start:  12/06/24    Expected End:  02/04/25

## 2025-05-23 ENCOUNTER — TREATMENT (OUTPATIENT)
Dept: PHYSICAL THERAPY | Facility: CLINIC | Age: 40
End: 2025-05-23
Payer: COMMERCIAL

## 2025-05-23 DIAGNOSIS — G57.01 NEUROPATHY OF RIGHT SCIATIC NERVE: ICD-10-CM

## 2025-05-23 DIAGNOSIS — G57.02 NEUROPATHY OF LEFT SCIATIC NERVE: ICD-10-CM

## 2025-05-23 DIAGNOSIS — M79.605 PAIN IN BOTH LOWER EXTREMITIES: ICD-10-CM

## 2025-05-23 DIAGNOSIS — M79.604 PAIN IN BOTH LOWER EXTREMITIES: ICD-10-CM

## 2025-05-23 PROCEDURE — 97110 THERAPEUTIC EXERCISES: CPT | Mod: GP

## 2025-05-23 NOTE — PROGRESS NOTES
"Physical Therapy    Physical Therapy Treatment    Patient Name: Deena Art  MRN: [id number]  Today's Date: 5/23/2025    Time Entry:   Time Calculation  Start Time: 1030  Stop Time: 1108  Time Calculation (min): 38 min     PT Therapeutic Procedures Time Entry  Therapeutic Exercise Time Entry: 38                 Visit Count: 14    Assessment:   Pt did really well with mini squats today. Fatigued at end of session. Encouraged her to add squats and table planks at home.     Plan:   Increase reps of reverse slider lunge    Current Problem  1. Pain in both lower extremities  Follow Up In Physical Therapy      2. Neuropathy of left sciatic nerve  Follow Up In Physical Therapy      3. Neuropathy of right sciatic nerve  Follow Up In Physical Therapy              Subjective    \"No issues this week, but didn't work on any new exercises\"  Precautions   None   Pain   0/10 pain    Objective   Good squat form at rail    Treatments:  Therapeutic Exercise              PPT 5 sec, 2x10   TA w/sequential march x 15   TA w/bridge w/3# lift 3 sec x 10              Hooklying ball squeeze w/TA 5 sec x 20              Hooklying clam w/TA 5 sec x 20 (black)   Clamshell w/Black TB x 10 ea   Mini squat at rail 2 x 15   DL calf raises x 20   Lat amb x 3 laps (yellow at ankles)   Seated lumbar flexion stretch 5\" x 10   Seated hamstring stretch 30\" x 2 ea   7\" step up w/march + RKB lift x 10 ea    Reverse slider lunge x 10 ea    Ccamb 6 plates x 5 retro and forward   Table plank 15 sec x 2         Goals:  Active       PT Problem       Patient will be independent with home exercise program for home maintenance.        Start:  12/06/24    Expected End:  01/05/25            Pt will report 50% decrease (not daily) in frequency of L thigh electric shock pains to indicate overall improvement in symptoms.        Start:  12/06/24    Expected End:  01/05/25            Pt will increase L hip strength to 5/5 in weakened muscle groups to facilitate improved " lumbopelvic support for weightbearing activities.        Start:  12/06/24    Expected End:  02/04/25            Patient will decrease score on Modified ENRIQUE to less than 10 % to indicate decreased pain and increased functional level.         Start:  12/06/24    Expected End:  02/04/25

## 2025-05-30 ENCOUNTER — TREATMENT (OUTPATIENT)
Dept: PHYSICAL THERAPY | Facility: CLINIC | Age: 40
End: 2025-05-30
Payer: COMMERCIAL

## 2025-05-30 DIAGNOSIS — M79.605 PAIN IN BOTH LOWER EXTREMITIES: ICD-10-CM

## 2025-05-30 DIAGNOSIS — G57.01 NEUROPATHY OF RIGHT SCIATIC NERVE: ICD-10-CM

## 2025-05-30 DIAGNOSIS — G57.02 NEUROPATHY OF LEFT SCIATIC NERVE: ICD-10-CM

## 2025-05-30 DIAGNOSIS — M79.604 PAIN IN BOTH LOWER EXTREMITIES: ICD-10-CM

## 2025-05-30 PROCEDURE — 97110 THERAPEUTIC EXERCISES: CPT | Mod: GP

## 2025-05-30 NOTE — PROGRESS NOTES
"Physical Therapy    Physical Therapy Treatment    Patient Name: Deena Art  MRN: [id number]  Today's Date: 5/30/2025    Time Entry:   Time Calculation  Start Time: 1032  Stop Time: 1111  Time Calculation (min): 39 min     PT Therapeutic Procedures Time Entry  Therapeutic Exercise Time Entry: 39                 Visit Count: 15    Assessment:   Pt felt fatigued at end of session, but tolerated exercises well.     Plan:   Cont progressing core strength    Current Problem  1. Pain in both lower extremities  Follow Up In Physical Therapy      2. Neuropathy of left sciatic nerve  Follow Up In Physical Therapy      3. Neuropathy of right sciatic nerve  Follow Up In Physical Therapy                Subjective    Felt muscle soreness in abs the next day after last session  Precautions   None   Pain   0/10 pain    Objective   Good squat form at rail    Treatments:  Therapeutic Exercise              PPT 5 sec, 2x10   TA w/sequential march x 15   TA w/bridge w/3# lift 3 sec x 10              Hooklying ball squeeze w/TA 5 sec x 20              Hooklying clam w/TA 5 sec x 20 (black)   Clamshell w/Black TB x 10 ea   Ccamb 6 plates x 5 retro and forward   Mini squat at rail 2 x 15   DL calf raises x 20   7\" step up w/march + RKB lift x 10 ea    Slant board stretch 30 sec x 2   Table plank 15 sec x 2   Reverse slider lunge x 12 ea    Lat amb x 3 laps (yellow at ankles)   Seated lumbar flexion stretch 5\" x 10   Seated hamstring stretch 30\" x 2 ea      Goals:  Active       PT Problem       Patient will be independent with home exercise program for home maintenance.        Start:  12/06/24    Expected End:  01/05/25            Pt will report 50% decrease (not daily) in frequency of L thigh electric shock pains to indicate overall improvement in symptoms.        Start:  12/06/24    Expected End:  01/05/25            Pt will increase L hip strength to 5/5 in weakened muscle groups to facilitate improved lumbopelvic support for " weightbearing activities.        Start:  12/06/24    Expected End:  02/04/25            Patient will decrease score on Modified ENRIQUE to less than 10 % to indicate decreased pain and increased functional level.         Start:  12/06/24    Expected End:  02/04/25

## 2025-06-13 ENCOUNTER — APPOINTMENT (OUTPATIENT)
Dept: PHYSICAL THERAPY | Facility: CLINIC | Age: 40
End: 2025-06-13
Payer: COMMERCIAL

## 2025-06-16 DIAGNOSIS — E66.01 MORBID OBESITY WITH BMI OF 40.0-44.9, ADULT (MULTI): ICD-10-CM

## 2025-06-20 ENCOUNTER — APPOINTMENT (OUTPATIENT)
Dept: PHYSICAL THERAPY | Facility: CLINIC | Age: 40
End: 2025-06-20
Payer: COMMERCIAL

## 2025-08-18 DIAGNOSIS — E66.01 MORBID OBESITY WITH BMI OF 40.0-44.9, ADULT (MULTI): Primary | ICD-10-CM
